# Patient Record
Sex: FEMALE | Race: BLACK OR AFRICAN AMERICAN | Employment: UNEMPLOYED | URBAN - METROPOLITAN AREA
[De-identification: names, ages, dates, MRNs, and addresses within clinical notes are randomized per-mention and may not be internally consistent; named-entity substitution may affect disease eponyms.]

---

## 2018-08-01 ENCOUNTER — OFFICE VISIT (OUTPATIENT)
Dept: INTERPRETER SERVICES | Facility: CLINIC | Age: 58
End: 2018-08-01
Payer: COMMERCIAL

## 2018-08-01 ENCOUNTER — OFFICE VISIT (OUTPATIENT)
Dept: FAMILY MEDICINE | Facility: CLINIC | Age: 58
End: 2018-08-01
Payer: COMMERCIAL

## 2018-08-01 ENCOUNTER — HOSPITAL ENCOUNTER (OUTPATIENT)
Dept: GENERAL RADIOLOGY | Facility: CLINIC | Age: 58
Discharge: HOME | End: 2018-08-01
Attending: NURSE PRACTITIONER | Admitting: NURSE PRACTITIONER
Payer: COMMERCIAL

## 2018-08-01 VITALS
OXYGEN SATURATION: 98 % | DIASTOLIC BLOOD PRESSURE: 70 MMHG | SYSTOLIC BLOOD PRESSURE: 130 MMHG | WEIGHT: 208 LBS | HEIGHT: 65 IN | HEART RATE: 80 BPM | TEMPERATURE: 97.2 F | BODY MASS INDEX: 34.66 KG/M2 | RESPIRATION RATE: 14 BRPM

## 2018-08-01 DIAGNOSIS — R76.12 POSITIVE QUANTIFERON-TB GOLD TEST: ICD-10-CM

## 2018-08-01 DIAGNOSIS — R61 NIGHT SWEATS: ICD-10-CM

## 2018-08-01 DIAGNOSIS — Z22.7 LATENT TUBERCULOSIS BY BLOOD TEST: ICD-10-CM

## 2018-08-01 DIAGNOSIS — R07.0 THROAT PAIN: Primary | ICD-10-CM

## 2018-08-01 DIAGNOSIS — R68.83 CHILLS: ICD-10-CM

## 2018-08-01 DIAGNOSIS — R30.0 DYSURIA: ICD-10-CM

## 2018-08-01 LAB
ALBUMIN UR-MCNC: NEGATIVE MG/DL
APPEARANCE UR: CLEAR
BILIRUB UR QL STRIP: NEGATIVE
COLOR UR AUTO: YELLOW
DEPRECATED S PYO AG THROAT QL EIA: NORMAL
GLUCOSE UR STRIP-MCNC: NEGATIVE MG/DL
HGB UR QL STRIP: NEGATIVE
KETONES UR STRIP-MCNC: NEGATIVE MG/DL
LEUKOCYTE ESTERASE UR QL STRIP: NEGATIVE
NITRATE UR QL: NEGATIVE
PH UR STRIP: 6 PH (ref 5–7)
SOURCE: NORMAL
SP GR UR STRIP: 1.01 (ref 1–1.03)
SPECIMEN SOURCE: NORMAL
UROBILINOGEN UR STRIP-ACNC: 0.2 EU/DL (ref 0.2–1)

## 2018-08-01 PROCEDURE — 87880 STREP A ASSAY W/OPTIC: CPT | Performed by: NURSE PRACTITIONER

## 2018-08-01 PROCEDURE — 87081 CULTURE SCREEN ONLY: CPT | Performed by: NURSE PRACTITIONER

## 2018-08-01 PROCEDURE — 71046 X-RAY EXAM CHEST 2 VIEWS: CPT

## 2018-08-01 PROCEDURE — 99205 OFFICE O/P NEW HI 60 MIN: CPT | Performed by: NURSE PRACTITIONER

## 2018-08-01 PROCEDURE — T1013 SIGN LANG/ORAL INTERPRETER: HCPCS | Mod: U3

## 2018-08-01 PROCEDURE — 81003 URINALYSIS AUTO W/O SCOPE: CPT | Performed by: NURSE PRACTITIONER

## 2018-08-01 RX ORDER — CETIRIZINE HYDROCHLORIDE 10 MG/1
10 TABLET ORAL DAILY PRN
COMMUNITY
Start: 2018-03-30 | End: 2018-08-14

## 2018-08-01 NOTE — PATIENT INSTRUCTIONS
Tuberculosis (TB)  Tuberculosis (TB) is a serious disease caused by bacteria that spread from person to person through the air. Most often, TB infects the lungs, but it can also affect other parts of the body including the brain, kidneys and spine. When not treated properly, TB can be fatal. Here is more information about TB, how it is treated, and ways to help prevent its spread.     TB germs spread through the air when someone with the active form of TB coughs or sneezes.    What are the risk factors for TB?  Anyone can get TB, but your risk is greatest if you:    Have an immune system weakened by medicinessuch as steroids or a disease such as HIV infection    Have close contact with someone who has untreated active TB    Are elderly    Live or work in a residential facility, such as a shelter, nursing home, or halfway    Travel to or come from a country where TB is common  How does TB spread?  TB germs (bacteria) are released into the air when someone with the active form of TB coughs or sneezes. The bacteria spread easily, especially in crowded places with poor airflow. The longer you breathe these germs, the more likely you are to become infected.  What are the symptoms of TB?  There are 2 types of TB: inactive (also called latent TB infection) and active (also called TB disease).  Inactive TB (latent TB infection)  If you have been diagnosed with inactive TB, it means you:    Have live TB bacteria in your lungs, but the germs have been sealed off, much like a scab covers a wound. As a result, you don t have symptoms or feel sick. The only way to know you have inactive TB is with a TB test.    Can t spread the infection to others    May need medicine to keep the infection from becoming active at some future time  Active TB (TB disease)  If you have been diagnosed with active TB, it means you:    Have symptoms of TB such as a lasting cough, tiredness (fatigue), fever, night sweats, and weight loss. You are  likely to feel very sick.    Can spread the infection to others.    Must take medicine to help cure the disease. Treatment often takes months. TB can be hard to cure.  How is TB diagnosed?  Two tests can help detect TB infection:    Skin test (PPD). A testing solution is placed just beneath the skin on your arm to see if a reaction (such as a hard, red bump) occurs. You will need to return to the office in 2 or 3 days to have your arm checked. Be sure to keep the appointment. You will learn the test results during this visit.    Blood test. In this test, a small amount of blood is drawn and sent to a lab for testing. Your healthcare provider can tell you if this test is offered in your area.    Other tests. If you have TB infection, other tests, such as a chest X-ray, are needed to learn if the infection is active. Your healthcare provider may also take a sample of your sputum (mucus that comes up when you cough). The sample is sent to a lab and tested for TB bacteria. Knowing the type of bacteria causing your illness helps your provider choose the right medicine to treat the disease.  What do the test results mean?    A negative result usually means that your body is free of TB bacteria.    A positive result means you have been exposed to the germs that cause TB. You may have an inactive or active infection.  How is TB treated?    Both inactive and active TB are treated with medicines. If you have active TB, you may take more medicine for a longer time.    You will likely begin feeling better shortly after starting treatment, but be sure to keep taking all the medicine you have been prescribed. This is the only way to cure the disease. Not taking all the medicine means you won t get well and can continue to spread TB germs to others.    Sometimes TB is drug-resistant. This means it doesn t respond to 1 or more of the usual medicines for TB. Resistant TB is harder to cure and is usually managed by a TB  specialist.  What is DOT?  During treatment, you may participate in a program called DOT (directly observed therapy). In this program, a nurse or healthcare worker supervises your treatment. This is a standard approach. It makes it easier to manage medicines, watch for any side effects, and make sure the medicines are working. You can also ask a friend or family member to remind you to take your medicine.   During treatment for TB    Make sure to take all the medicine as directed, even when you start feeling better. You will take the medicine for 6 months or longer. Sticking to this schedule takes patience. But stopping treatment early means your symptoms may come back. It may also lead to drug-resistant TB.    Get plenty of rest and eat healthy meals. A nutritious diet full of fresh fruits and vegetables helps the body fight infection.    Check with your healthcare provider before using any over-the-counter medicines that haven t been prescribed.    If you are taking birth control pills, you may need to use an additional backup method of birth control. Some TB medicines may make the pill less effective.    Limit your activity to avoid fatigue. Plan frequent rest periods.     Keep your medical appointments. You will need to be checked often to make sure that your medicine is working and you are getting better.  How family and friends can help  TB is a serious illness that takes a long time to cure. If you have a family member or friend with TB, you can help by reminding your loved one to:    Take TB medicines at the same time every day (they re best taken with water, milk, or juice 30 minutes before meals or at bedtime)    Keep all follow-up appointments (you can help by driving or arranging for a ride)    Get plenty of rest    Eat healthy meals  Preventing the spread of TB  If you have active TB, you should:    Ask family, friends, and the people you work with to get tested. Active TB can spread to other  people.    Avoid close contact with others until your healthcare provider says it s OK.    Wash your hands often, especially after coughing.    Use a tissue to cover your mouth when you cough. If you don t have a tissue, cough into your elbow, not your hand.    Wear a mask, if you have been told to do so, when you go out in public or visit your healthcare provider    Use a plastic bag to throw away old tissues and other supplies.  When to seek medical care  Call your health care provider right away if you have any of the following:    A fever of 100.4 F (38 C) or higher    Increased coughing or coughing up blood    Chest pain or shortness of breath    Worsening or recurring night sweats    Trouble breathing  Also call your healthcare provider if you are taking TB medicine and think you are having side effects. These include skin rash, yellowing of the eyes, or stomach problems.   Date Last Reviewed: 12/1/2016 2000-2017 [employer]. [address] [place] [address]. All rights reserved. This information is not intended as a substitute for professional medical care. Always follow your healthcare professional's instructions.        Self-Care for Sore Throats    Sore throats happen for many reasons, such as colds, allergies, and infections caused by viruses or bacteria. In any case, your throat becomes red and sore. Your goal for self-care is to reduce your discomfort while giving your throat a chance to heal.  Moisten and soothe your throat  Tips include the following:    Try a sip of water first thing after waking up.    Keep your throat moist by drinking 6 or more glasses of clear liquids every day.    Run a cool-air humidifier in your room overnight.    Avoid cigarette smoke.     Suck on throat lozenges, cough drops, hard candy, ice chips, or frozen fruit-juice bars. Use the sugar-free versions if your diet or medical condition requires them.  Gargle to ease irritation  Gargling every hour  or 2 can ease irritation. Try gargling with 1 of these solutions:    1/4 teaspoon of salt in 1/2 cup of warm water    An over-the-counter anesthetic gargle  Use medicine for more relief  Over-the-counter medicine can reduce sore throat symptoms. Ask your pharmacist if you have questions about which medicine to use:    Ease pain with anesthetic sprays. Aspirin or an aspirin substitute also helps. Remember, never give aspirin to anyone 18 or younger, or if you are already taking blood thinners.     For sore throats caused by allergies, try antihistamines to block the allergic reaction.    Remember: unless a sore throat is caused by a bacterial infection, antibiotics won t help you.  Prevent future sore throats  Prevention tips include the following:    Stop smoking or reduce contact with secondhand smoke. Smoke irritates the tender throat lining.    Limit contact with pets and with allergy-causing substances, such as pollen and mold.    When you re around someone with a sore throat or cold, wash your hands often to keep viruses or bacteria from spreading.    Don t strain your vocal cords.  Call your healthcare provider  Contact your healthcare provider if you have:    A temperature over 101 F (38.3 C)    White spots on the throat    Great difficulty swallowing    Trouble breathing    A skin rash    Recent exposure to someone else with strep bacteria    Severe hoarseness and swollen glands in the neck or jaw   Date Last Reviewed: 8/1/2016 2000-2017 [employer] [address] [place] [address]. All rights reserved. This information is not intended as a substitute for professional medical care. Always follow your healthcare professional's instructions.

## 2018-08-01 NOTE — PROGRESS NOTES
SUBJECTIVE:   Zoey Sinha is a 57 year old female who presents to clinic today for the following health issues:    Acute Illness   Acute illness concerns: Sore throat and cough   Onset: Months- close to a year    Fever: YES    Chills/Sweats: YES    Headache (location?): YES    Sinus Pressure:YES    Conjunctivitis:  YES: right eye    Ear Pain: no    Rhinorrhea: YES    Congestion: no     Sore Throat: YES, worse when coughing and hard time sleeping      Cough: YES    Wheeze: YES    Decreased Appetite: no    Nausea: no     Vomiting: no     Diarrhea:  no     Dysuria/Freq.: YES, frequent urination, burning urine     Fatigue/Achiness: YES    Sick/Strep Exposure: no      COMES AND GOES  SAME SYMPTOMS EACH TIME COMES BACK  Coughing, night sweats     Therapies Tried and outcome: None     Noted positive Tb test and H pylori 2/2018  QUANITFERON GOLD POSITIVE   CXR never had as far as she or her daughter know    concentrated urine and very low water intake per dtr    PROBLEMS TO ADD ON...  [facility] at very end of visit they want a referral for her nerve pain, chronic issue, not further addressed today     Problem list and histories reviewed & adjusted, as indicated.  Additional history: as documented    There is no problem list on file for this patient.    History reviewed. No pertinent surgical history.    Social History   Substance Use Topics     Smoking status: Never Smoker     Smokeless tobacco: Never Used     Alcohol use No     Family History   Problem Relation Age of Onset     Diabetes Father      Diabetes Brother          Current Outpatient Prescriptions   Medication Sig Dispense Refill     cetirizine (ZYRTEC) 10 MG tablet Take 10 mg by mouth daily as needed       ranitidine (ZANTAC) 150 MG tablet Take 150 mg by mouth daily as needed       No Known Allergies  No lab results found.   BP Readings from Last 3 Encounters:   08/01/18 130/70    Wt Readings from Last 3 Encounters:   08/01/18 208 lb (94.3 kg)                 "  Labs reviewed in EPIC    Reviewed and updated as needed this visit by clinical staff       Reviewed and updated as needed this visit by Provider         ROS:  Constitutional, HEENT, cardiovascular, pulmonary, GI, , musculoskeletal, neuro, skin, endocrine and psych systems are negative, except as otherwise noted.    OBJECTIVE:     /70  Pulse 80  Temp 97.2  F (36.2  C) (Oral)  Resp 14  Ht 5' 5.35\" (1.66 m)  Wt 208 lb (94.3 kg)  SpO2 98%  BMI 34.24 kg/m2  Body mass index is 34.24 kg/(m^2).  EXAM:   CONSTITUTIONAL: mildly ill appearing, obese, alert and no distress  EYES: Eyes grossly normal to inspection, PERRL and conjunctivae and sclerae normal  HEENT: Head normocephalic. Neck supple,  no adenopathy, TM's normal bilaterally, no sinus tenderness,  no rhinorrhea, mucous membranes dry. No tonsillar hypertrophy, pharynx slightly erythematous without exudate, swabbed samples collected and sent to lab  CV: S1 normal, S2 normal, no murmurs, clicks, or gallops and extremities warm  RESP: normal respiratory rate and rhythm, lungs clear to auscultation, strong moist sounding nonproductive cough noted   GI: Abdomen soft, non-tender. BS normal. No masses, organomegaly exam limited by obesity   Skin: no suspicious lesions or rashes, good skin turgor  Neurologic: strength and tone- normal, sensory exam- grossly normal, mentation appears normal   Diagnostic Test Results:  Results for orders placed or performed in visit on 08/01/18 (from the past 24 hour(s))   Strep, Rapid Screen   Result Value Ref Range    Specimen Description Throat     Rapid Strep A Screen       NEGATIVE: No Group A streptococcal antigen detected by immunoassay, await culture report.   UA reflex to Microscopic and Culture   Result Value Ref Range    Color Urine Yellow     Appearance Urine Clear     Glucose Urine Negative NEG^Negative mg/dL    Bilirubin Urine Negative NEG^Negative    Ketones Urine Negative NEG^Negative mg/dL    Specific Gravity " Urine 1.010 1.003 - 1.035    Blood Urine Negative NEG^Negative    pH Urine 6.0 5.0 - 7.0 pH    Protein Albumin Urine Negative NEG^Negative mg/dL    Urobilinogen Urine 0.2 0.2 - 1.0 EU/dL    Nitrite Urine Negative NEG^Negative    Leukocyte Esterase Urine Negative NEG^Negative    Source Midstream Urine      CXR - negative  Exam: XR CHEST 2 VW, 8/1/2018 1:23 PM     Indication: ; Positive QuantiFERON-TB Gold test; Chills; Night sweats     Comparison: None     Findings:   Heart and pulmonary vasculature is within normal limits. The lungs are  clear. Bones unremarkable. Upper abdomen unremarkable.         Impression: No evidence for tuberculosis, radiographically.     MICHELLE ABRAHAM MD    ASSESSMENT/PLAN:         ICD-10-CM    1. Throat pain R07.0 Strep, Rapid Screen     Beta strep group A culture   2. Dysuria R30.0 UA reflex to Microscopic and Culture   3. Positive QuantiFERON-TB Gold test R76.12 XR Chest 2 Views   4. Chills R68.83 XR Chest 2 Views   5. Night sweats R61 XR Chest 2 Views   new pt here who reports fever and coughing, sore throat and night sweats going on for about a year off and on, on Care Everywhere noted quant gold positive Tb test and no records of CXR and pt does not think has ever had this done, no known BCG vaccine or prior treatment  Discussed symptoms suspicious for Tb and needs further work-up, advised to wear a mask until results are back. Gave info below, monitor symptoms   Push fluids and symptom management for possible viral illness, mucinex and tylenol prn, pt otherwise stable and strep negative, culture pending    Denies any vomiting and no fever here, if worsening or any red flag symptoms discussed UC and ER in off hours. Otherwise follow up with me to go over results within 1 week.     Dysuria unknown cause, likely due to concentrated urine very low water intake per dtr, push fluids to see if helps. Follow up if not resolving with this.     Pt's dtr added pt has nerve pain sometimes and  want referral, was at end of visit and sounds chronic problem we did not address and they are okay waiting until next visit to address this problem.       Patient Instructions       Tuberculosis (TB)  Tuberculosis (TB) is a serious disease caused by bacteria that spread from person to person through the air. Most often, TB infects the lungs, but it can also affect other parts of the body including the brain, kidneys and spine. When not treated properly, TB can be fatal. Here is more information about TB, how it is treated, and ways to help prevent its spread.     TB germs spread through the air when someone with the active form of TB coughs or sneezes.    What are the risk factors for TB?  Anyone can get TB, but your risk is greatest if you:    Have an immune system weakened by medicinessuch as steroids or a disease such as HIV infection    Have close contact with someone who has untreated active TB    Are elderly    Live or work in a residential facility, such as a shelter, nursing home, or penitentiary    Travel to or come from a country where TB is common  How does TB spread?  TB germs (bacteria) are released into the air when someone with the active form of TB coughs or sneezes. The bacteria spread easily, especially in crowded places with poor airflow. The longer you breathe these germs, the more likely you are to become infected.  What are the symptoms of TB?  There are 2 types of TB: inactive (also called latent TB infection) and active (also called TB disease).  Inactive TB (latent TB infection)  If you have been diagnosed with inactive TB, it means you:    Have live TB bacteria in your lungs, but the germs have been sealed off, much like a scab covers a wound. As a result, you don t have symptoms or feel sick. The only way to know you have inactive TB is with a TB test.    Can t spread the infection to others    May need medicine to keep the infection from becoming active at some future time  Active TB (TB  disease)  If you have been diagnosed with active TB, it means you:    Have symptoms of TB such as a lasting cough, tiredness (fatigue), fever, night sweats, and weight loss. You are likely to feel very sick.    Can spread the infection to others.    Must take medicine to help cure the disease. Treatment often takes months. TB can be hard to cure.  How is TB diagnosed?  Two tests can help detect TB infection:    Skin test (PPD). A testing solution is placed just beneath the skin on your arm to see if a reaction (such as a hard, red bump) occurs. You will need to return to the office in 2 or 3 days to have your arm checked. Be sure to keep the appointment. You will learn the test results during this visit.    Blood test. In this test, a small amount of blood is drawn and sent to a lab for testing. Your healthcare provider can tell you if this test is offered in your area.    Other tests. If you have TB infection, other tests, such as a chest X-ray, are needed to learn if the infection is active. Your healthcare provider may also take a sample of your sputum (mucus that comes up when you cough). The sample is sent to a lab and tested for TB bacteria. Knowing the type of bacteria causing your illness helps your provider choose the right medicine to treat the disease.  What do the test results mean?    A negative result usually means that your body is free of TB bacteria.    A positive result means you have been exposed to the germs that cause TB. You may have an inactive or active infection.  How is TB treated?    Both inactive and active TB are treated with medicines. If you have active TB, you may take more medicine for a longer time.    You will likely begin feeling better shortly after starting treatment, but be sure to keep taking all the medicine you have been prescribed. This is the only way to cure the disease. Not taking all the medicine means you won t get well and can continue to spread TB germs to  others.    Sometimes TB is drug-resistant. This means it doesn t respond to 1 or more of the usual medicines for TB. Resistant TB is harder to cure and is usually managed by a TB specialist.  What is DOT?  During treatment, you may participate in a program called DOT (directly observed therapy). In this program, a nurse or healthcare worker supervises your treatment. This is a standard approach. It makes it easier to manage medicines, watch for any side effects, and make sure the medicines are working. You can also ask a friend or family member to remind you to take your medicine.   During treatment for TB    Make sure to take all the medicine as directed, even when you start feeling better. You will take the medicine for 6 months or longer. Sticking to this schedule takes patience. But stopping treatment early means your symptoms may come back. It may also lead to drug-resistant TB.    Get plenty of rest and eat healthy meals. A nutritious diet full of fresh fruits and vegetables helps the body fight infection.    Check with your healthcare provider before using any over-the-counter medicines that haven t been prescribed.    If you are taking birth control pills, you may need to use an additional backup method of birth control. Some TB medicines may make the pill less effective.    Limit your activity to avoid fatigue. Plan frequent rest periods.     Keep your medical appointments. You will need to be checked often to make sure that your medicine is working and you are getting better.  How family and friends can help  TB is a serious illness that takes a long time to cure. If you have a family member or friend with TB, you can help by reminding your loved one to:    Take TB medicines at the same time every day (they re best taken with water, milk, or juice 30 minutes before meals or at bedtime)    Keep all follow-up appointments (you can help by driving or arranging for a ride)    Get plenty of rest    Eat healthy  meals  Preventing the spread of TB  If you have active TB, you should:    Ask family, friends, and the people you work with to get tested. Active TB can spread to other people.    Avoid close contact with others until your healthcare provider says it s OK.    Wash your hands often, especially after coughing.    Use a tissue to cover your mouth when you cough. If you don t have a tissue, cough into your elbow, not your hand.    Wear a mask, if you have been told to do so, when you go out in public or visit your healthcare provider    Use a plastic bag to throw away old tissues and other supplies.  When to seek medical care  Call your health care provider right away if you have any of the following:    A fever of 100.4 F (38 C) or higher    Increased coughing or coughing up blood    Chest pain or shortness of breath    Worsening or recurring night sweats    Trouble breathing  Also call your healthcare provider if you are taking TB medicine and think you are having side effects. These include skin rash, yellowing of the eyes, or stomach problems.   Date Last Reviewed: 12/1/2016 2000-2017 [employer]. [address] [place] [address]. All rights reserved. This information is not intended as a substitute for professional medical care. Always follow your healthcare professional's instructions.        Self-Care for Sore Throats    Sore throats happen for many reasons, such as colds, allergies, and infections caused by viruses or bacteria. In any case, your throat becomes red and sore. Your goal for self-care is to reduce your discomfort while giving your throat a chance to heal.  Moisten and soothe your throat  Tips include the following:    Try a sip of water first thing after waking up.    Keep your throat moist by drinking 6 or more glasses of clear liquids every day.    Run a cool-air humidifier in your room overnight.    Avoid cigarette smoke.     Suck on throat lozenges, cough drops, hard  candy, ice chips, or frozen fruit-juice bars. Use the sugar-free versions if your diet or medical condition requires them.  Gargle to ease irritation  Gargling every hour or 2 can ease irritation. Try gargling with 1 of these solutions:    1/4 teaspoon of salt in 1/2 cup of warm water    An over-the-counter anesthetic gargle  Use medicine for more relief  Over-the-counter medicine can reduce sore throat symptoms. Ask your pharmacist if you have questions about which medicine to use:    Ease pain with anesthetic sprays. Aspirin or an aspirin substitute also helps. Remember, never give aspirin to anyone 18 or younger, or if you are already taking blood thinners.     For sore throats caused by allergies, try antihistamines to block the allergic reaction.    Remember: unless a sore throat is caused by a bacterial infection, antibiotics won t help you.  Prevent future sore throats  Prevention tips include the following:    Stop smoking or reduce contact with secondhand smoke. Smoke irritates the tender throat lining.    Limit contact with pets and with allergy-causing substances, such as pollen and mold.    When you re around someone with a sore throat or cold, wash your hands often to keep viruses or bacteria from spreading.    Don t strain your vocal cords.  Call your healthcare provider  Contact your healthcare provider if you have:    A temperature over 101 F (38.3 C)    White spots on the throat    Great difficulty swallowing    Trouble breathing    A skin rash    Recent exposure to someone else with strep bacteria    Severe hoarseness and swollen glands in the neck or jaw   Date Last Reviewed: 8/1/2016 2000-2017 [employer]. [address] [place] [address], [place] [address]. All rights reserved. This information is not intended as a substitute for professional medical care. Always follow your healthcare professional's instructions.        60 min spent in direct face to face time with this pt, greater than  50% in counseling and coordination of care of above diagnoses      RANDALL Mendoza [facility]

## 2018-08-01 NOTE — MR AVS SNAPSHOT
After Visit Summary   8/1/2018    Zoey Sinha    MRN: [id number]           Patient Information     Date Of Birth          1960        Visit Information        Provider Department      8/1/2018 11:15 AM Holly Salas APRN CNP; LANGUAGE [facility]        Today's Diagnoses     Throat pain    -  1    Dysuria        Positive QuantiFERON-TB Gold test        Chills        Night sweats          Care Instructions      Tuberculosis (TB)  Tuberculosis (TB) is a serious disease caused by bacteria that spread from person to person through the air. Most often, TB infects the lungs, but it can also affect other parts of the body including the brain, kidneys and spine. When not treated properly, TB can be fatal. Here is more information about TB, how it is treated, and ways to help prevent its spread.     TB germs spread through the air when someone with the active form of TB coughs or sneezes.    What are the risk factors for TB?  Anyone can get TB, but your risk is greatest if you:    Have an immune system weakened by medicinessuch as steroids or a disease such as HIV infection    Have close contact with someone who has untreated active TB    Are elderly    Live or work in a residential facility, such as a shelter, nursing home, or halfway    Travel to or come from a country where TB is common  How does TB spread?  TB germs (bacteria) are released into the air when someone with the active form of TB coughs or sneezes. The bacteria spread easily, especially in crowded places with poor airflow. The longer you breathe these germs, the more likely you are to become infected.  What are the symptoms of TB?  There are 2 types of TB: inactive (also called latent TB infection) and active (also called TB disease).  Inactive TB (latent TB infection)  If you have been diagnosed with inactive TB, it means you:    Have live TB bacteria in your lungs, but the germs have been sealed off, much like a  scab covers a wound. As a result, you don t have symptoms or feel sick. The only way to know you have inactive TB is with a TB test.    Can t spread the infection to others    May need medicine to keep the infection from becoming active at some future time  Active TB (TB disease)  If you have been diagnosed with active TB, it means you:    Have symptoms of TB such as a lasting cough, tiredness (fatigue), fever, night sweats, and weight loss. You are likely to feel very sick.    Can spread the infection to others.    Must take medicine to help cure the disease. Treatment often takes months. TB can be hard to cure.  How is TB diagnosed?  Two tests can help detect TB infection:    Skin test (PPD). A testing solution is placed just beneath the skin on your arm to see if a reaction (such as a hard, red bump) occurs. You will need to return to the office in 2 or 3 days to have your arm checked. Be sure to keep the appointment. You will learn the test results during this visit.    Blood test. In this test, a small amount of blood is drawn and sent to a lab for testing. Your healthcare provider can tell you if this test is offered in your area.    Other tests. If you have TB infection, other tests, such as a chest X-ray, are needed to learn if the infection is active. Your healthcare provider may also take a sample of your sputum (mucus that comes up when you cough). The sample is sent to a lab and tested for TB bacteria. Knowing the type of bacteria causing your illness helps your provider choose the right medicine to treat the disease.  What do the test results mean?    A negative result usually means that your body is free of TB bacteria.    A positive result means you have been exposed to the germs that cause TB. You may have an inactive or active infection.  How is TB treated?    Both inactive and active TB are treated with medicines. If you have active TB, you may take more medicine for a longer time.    You will  likely begin feeling better shortly after starting treatment, but be sure to keep taking all the medicine you have been prescribed. This is the only way to cure the disease. Not taking all the medicine means you won t get well and can continue to spread TB germs to others.    Sometimes TB is drug-resistant. This means it doesn t respond to 1 or more of the usual medicines for TB. Resistant TB is harder to cure and is usually managed by a TB specialist.  What is DOT?  During treatment, you may participate in a program called DOT (directly observed therapy). In this program, a nurse or healthcare worker supervises your treatment. This is a standard approach. It makes it easier to manage medicines, watch for any side effects, and make sure the medicines are working. You can also ask a friend or family member to remind you to take your medicine.   During treatment for TB    Make sure to take all the medicine as directed, even when you start feeling better. You will take the medicine for 6 months or longer. Sticking to this schedule takes patience. But stopping treatment early means your symptoms may come back. It may also lead to drug-resistant TB.    Get plenty of rest and eat healthy meals. A nutritious diet full of fresh fruits and vegetables helps the body fight infection.    Check with your healthcare provider before using any over-the-counter medicines that haven t been prescribed.    If you are taking birth control pills, you may need to use an additional backup method of birth control. Some TB medicines may make the pill less effective.    Limit your activity to avoid fatigue. Plan frequent rest periods.     Keep your medical appointments. You will need to be checked often to make sure that your medicine is working and you are getting better.  How family and friends can help  TB is a serious illness that takes a long time to cure. If you have a family member or friend with TB, you can help by reminding your  loved one to:    Take TB medicines at the same time every day (they re best taken with water, milk, or juice 30 minutes before meals or at bedtime)    Keep all follow-up appointments (you can help by driving or arranging for a ride)    Get plenty of rest    Eat healthy meals  Preventing the spread of TB  If you have active TB, you should:    Ask family, friends, and the people you work with to get tested. Active TB can spread to other people.    Avoid close contact with others until your healthcare provider says it s OK.    Wash your hands often, especially after coughing.    Use a tissue to cover your mouth when you cough. If you don t have a tissue, cough into your elbow, not your hand.    Wear a mask, if you have been told to do so, when you go out in public or visit your healthcare provider    Use a plastic bag to throw away old tissues and other supplies.  When to seek medical care  Call your health care provider right away if you have any of the following:    A fever of 100.4 F (38 C) or higher    Increased coughing or coughing up blood    Chest pain or shortness of breath    Worsening or recurring night sweats    Trouble breathing  Also call your healthcare provider if you are taking TB medicine and think you are having side effects. These include skin rash, yellowing of the eyes, or stomach problems.   Date Last Reviewed: 12/1/2016 2000-2017 [employer]. [address] [place] [address]. All rights reserved. This information is not intended as a substitute for professional medical care. Always follow your healthcare professional's instructions.        Self-Care for Sore Throats    Sore throats happen for many reasons, such as colds, allergies, and infections caused by viruses or bacteria. In any case, your throat becomes red and sore. Your goal for self-care is to reduce your discomfort while giving your throat a chance to heal.  Moisten and soothe your throat  Tips include the  following:    Try a sip of water first thing after waking up.    Keep your throat moist by drinking 6 or more glasses of clear liquids every day.    Run a cool-air humidifier in your room overnight.    Avoid cigarette smoke.     Suck on throat lozenges, cough drops, hard candy, ice chips, or frozen fruit-juice bars. Use the sugar-free versions if your diet or medical condition requires them.  Gargle to ease irritation  Gargling every hour or 2 can ease irritation. Try gargling with 1 of these solutions:    1/4 teaspoon of salt in 1/2 cup of warm water    An over-the-counter anesthetic gargle  Use medicine for more relief  Over-the-counter medicine can reduce sore throat symptoms. Ask your pharmacist if you have questions about which medicine to use:    Ease pain with anesthetic sprays. Aspirin or an aspirin substitute also helps. Remember, never give aspirin to anyone 18 or younger, or if you are already taking blood thinners.     For sore throats caused by allergies, try antihistamines to block the allergic reaction.    Remember: unless a sore throat is caused by a bacterial infection, antibiotics won t help you.  Prevent future sore throats  Prevention tips include the following:    Stop smoking or reduce contact with secondhand smoke. Smoke irritates the tender throat lining.    Limit contact with pets and with allergy-causing substances, such as pollen and mold.    When you re around someone with a sore throat or cold, wash your hands often to keep viruses or bacteria from spreading.    Don t strain your vocal cords.  Call your healthcare provider  Contact your healthcare provider if you have:    A temperature over 101 F (38.3 C)    White spots on the throat    Great difficulty swallowing    Trouble breathing    A skin rash    Recent exposure to someone else with strep bacteria    Severe hoarseness and swollen glands in the neck or jaw   Date Last Reviewed: 8/1/2016 2000-2017 [employer] [address]  "[place] [address]. All rights reserved. This information is not intended as a substitute for professional medical care. Always follow your healthcare professional's instructions.                Follow-ups after your visit        Future tests that were ordered for you today     Open Future Orders        Priority Expected Expires Ordered    XR Chest 2 Views Routine 8/1/2018 8/1/2019 8/1/2018            Who to contact     If you have questions or need follow up information about today's clinic visit or your schedule please contact [facility] directly at [phone].  Normal or non-critical lab and imaging results will be communicated to you by MyChart, letter or phone within 4 business days after the clinic has received the results. If you do not hear from us within 7 days, please contact the clinic through MyChart or phone. If you have a critical or abnormal lab result, we will notify you by phone as soon as possible.  Submit refill requests through [employer] or call your pharmacy and they will forward the refill request to us. Please allow 3 business days for your refill to be completed.          Additional Information About Your Visit        Care EveryWhere ID     This is your Care EveryWhere ID. This could be used by other organizations to access your [facility] medical records  [id number]        Your Vitals Were     Pulse Temperature Respirations Height Pulse Oximetry BMI (Body Mass Index)    80 97.2  F (36.2  C) (Oral) 14 5' 5.35\" (1.66 m) 98% 34.24 kg/m2       Blood Pressure from Last 3 Encounters:   08/01/18 130/70    Weight from Last 3 Encounters:   08/01/18 208 lb (94.3 kg)              We Performed the Following     Beta strep group A culture     Strep, Rapid Screen     UA reflex to Microscopic and Culture        Primary Care Provider Fax #    Physician No Ref-Primary [phone]       No address on file        Equal Access to Services     SHAE JOHN AH: Hadii isai torres " chau Cage, michael fayeadaha, qadesireeta kahenok rboerts, mikal yaritzain hayaajorge arroyotato emmanuelольга laRamosbonnie richie. So [facility] [phone].    ATENCIÓN: Si habla español, tiene a wright disposición servicios gratuitos de asistencia lingüística. Sammy al [phone].    We comply with applicable federal civil rights laws and [place] laws. We do not discriminate on the basis of race, color, national origin, age, disability, sex, sexual orientation, or gender identity.            Thank you!     Thank you for choosing [facility]  for your care. Our goal is always to provide you with excellent care. Hearing back from our patients is one way we can continue to improve our services. Please take a few minutes to complete the written survey that you may receive in the mail after your visit with us. Thank you!             Your Updated Medication List - Protect others around you: Learn how to safely use, store and throw away your medicines at www.disposemymeds.org.          This list is accurate as of 8/1/18 12:27 PM.  Always use your most recent med list.                   Brand Name Dispense Instructions for use Diagnosis    cetirizine 10 MG tablet    zyrTEC     Take 10 mg by mouth daily as needed        ranitidine 150 MG tablet    ZANTAC     Take 150 mg by mouth daily as needed

## 2018-08-02 LAB
BACTERIA SPEC CULT: NORMAL
SPECIMEN SOURCE: NORMAL

## 2018-08-07 ENCOUNTER — OFFICE VISIT (OUTPATIENT)
Dept: FAMILY MEDICINE | Facility: CLINIC | Age: 58
End: 2018-08-07
Payer: COMMERCIAL

## 2018-08-07 VITALS
SYSTOLIC BLOOD PRESSURE: 120 MMHG | BODY MASS INDEX: 34.24 KG/M2 | WEIGHT: 208 LBS | OXYGEN SATURATION: 100 % | TEMPERATURE: 98.7 F | HEART RATE: 88 BPM | RESPIRATION RATE: 16 BRPM | DIASTOLIC BLOOD PRESSURE: 60 MMHG

## 2018-08-07 DIAGNOSIS — R68.83 CHILLS (WITHOUT FEVER): ICD-10-CM

## 2018-08-07 DIAGNOSIS — R11.0 NAUSEA: ICD-10-CM

## 2018-08-07 DIAGNOSIS — B96.81 DUODENAL ULCER DUE TO HELICOBACTER PYLORI: ICD-10-CM

## 2018-08-07 DIAGNOSIS — M79.2 NERVE PAIN: ICD-10-CM

## 2018-08-07 DIAGNOSIS — K26.9 DUODENAL ULCER DUE TO HELICOBACTER PYLORI: ICD-10-CM

## 2018-08-07 DIAGNOSIS — M72.2 PLANTAR FASCIITIS: ICD-10-CM

## 2018-08-07 DIAGNOSIS — R10.84 ABDOMINAL PAIN, GENERALIZED: Primary | ICD-10-CM

## 2018-08-07 DIAGNOSIS — R09.82 PND (POST-NASAL DRIP): ICD-10-CM

## 2018-08-07 LAB
BASOPHILS # BLD AUTO: 0 10E9/L (ref 0–0.2)
BASOPHILS NFR BLD AUTO: 0.6 %
DIFFERENTIAL METHOD BLD: NORMAL
EOSINOPHIL # BLD AUTO: 0.1 10E9/L (ref 0–0.7)
EOSINOPHIL NFR BLD AUTO: 1.2 %
ERYTHROCYTE [DISTWIDTH] IN BLOOD BY AUTOMATED COUNT: 14.8 % (ref 10–15)
HCT VFR BLD AUTO: 42.6 % (ref 35–47)
HGB BLD-MCNC: 14.2 G/DL (ref 11.7–15.7)
LYMPHOCYTES # BLD AUTO: 2 10E9/L (ref 0.8–5.3)
LYMPHOCYTES NFR BLD AUTO: 40 %
MCH RBC QN AUTO: 29.2 PG (ref 26.5–33)
MCHC RBC AUTO-ENTMCNC: 33.3 G/DL (ref 31.5–36.5)
MCV RBC AUTO: 88 FL (ref 78–100)
MONOCYTES # BLD AUTO: 0.4 10E9/L (ref 0–1.3)
MONOCYTES NFR BLD AUTO: 8.8 %
NEUTROPHILS # BLD AUTO: 2.4 10E9/L (ref 1.6–8.3)
NEUTROPHILS NFR BLD AUTO: 49.4 %
PLATELET # BLD AUTO: 297 10E9/L (ref 150–450)
RBC # BLD AUTO: 4.86 10E12/L (ref 3.8–5.2)
VIT B12 SERPL-MCNC: 452 PG/ML (ref 193–986)
WBC # BLD AUTO: 4.9 10E9/L (ref 4–11)

## 2018-08-07 PROCEDURE — 99214 OFFICE O/P EST MOD 30 MIN: CPT | Performed by: NURSE PRACTITIONER

## 2018-08-07 PROCEDURE — 80048 BASIC METABOLIC PNL TOTAL CA: CPT | Performed by: NURSE PRACTITIONER

## 2018-08-07 PROCEDURE — 85025 COMPLETE CBC W/AUTO DIFF WBC: CPT | Performed by: NURSE PRACTITIONER

## 2018-08-07 PROCEDURE — 36415 COLL VENOUS BLD VENIPUNCTURE: CPT | Performed by: NURSE PRACTITIONER

## 2018-08-07 PROCEDURE — 82306 VITAMIN D 25 HYDROXY: CPT | Performed by: NURSE PRACTITIONER

## 2018-08-07 PROCEDURE — 82607 VITAMIN B-12: CPT | Performed by: NURSE PRACTITIONER

## 2018-08-07 PROCEDURE — 84443 ASSAY THYROID STIM HORMONE: CPT | Performed by: NURSE PRACTITIONER

## 2018-08-07 RX ORDER — CETIRIZINE HYDROCHLORIDE 10 MG/1
10 TABLET ORAL EVERY EVENING
Qty: 30 TABLET | Refills: 1 | Status: SHIPPED | OUTPATIENT
Start: 2018-08-07

## 2018-08-07 NOTE — PATIENT INSTRUCTIONS

## 2018-08-07 NOTE — PROGRESS NOTES
SUBJECTIVE:   Zoey Sinha is a 57 year old female who presents to clinic today for the following health issues:      Acute Illness   Acute illness concerns: Nausea   Onset: 8/1/2018    Fever: YES    Chills/Sweats: YES    Headache (location?): YES    Sinus Pressure:YES    Conjunctivitis:  Yes    Ear Pain: YES: both    Rhinorrhea: no    Congestion: no    Sore Throat: YES     Cough: no    Wheeze: no     Decreased Appetite: no     Nausea: YES    Vomiting: no     Diarrhea:  no     Dysuria/Freq.: YES    Fatigue/Achiness: no     Sick/Strep Exposure: no      Therapies Tried and outcome:  Nothing     Here for results from last office visit. ROS generally positive today, but after clarifying many symptoms with  today main problem is ongong mucous and drainage and also abd pain, Tightness in chest  When weather changes they have the AC on and notices more tightness in her chest, hasn't checked temp    Right foot pain   pain came back yesterday severe   History of Plantar fascitis, Injection in right foot helped --saw specialist and not sure if was steroid, not wearing good shoes here today sandals without arch support   Not really numb or tingling just shooting pains everywhere in her body       Problem list and histories reviewed & adjusted, as indicated.  Additional history: as documented    Patient Active Problem List   Diagnosis     Latent tuberculosis by blood test     Body mass index 34.0-34.9, adult     History reviewed. No pertinent surgical history.    Social History   Substance Use Topics     Smoking status: Never Smoker     Smokeless tobacco: Never Used     Alcohol use No     Family History   Problem Relation Age of Onset     Diabetes Father      Diabetes Brother          Current Outpatient Prescriptions   Medication Sig Dispense Refill     cetirizine (ZYRTEC) 10 MG tablet Take 1 tablet (10 mg) by mouth every evening 30 tablet 1     cetirizine (ZYRTEC) 10 MG tablet Take 10 mg by mouth daily as needed        ranitidine (ZANTAC) 150 MG tablet Take 150 mg by mouth daily as needed       No Known Allergies  Recent Labs   Lab Test  08/07/18   1517   CR  0.76   GFRESTIMATED  78   GFRESTBLACK  >90   POTASSIUM  4.0   TSH  1.08      BP Readings from Last 3 Encounters:   08/07/18 120/60   08/01/18 130/70    Wt Readings from Last 3 Encounters:   08/07/18 208 lb (94.3 kg)   08/01/18 208 lb (94.3 kg)                  Labs reviewed in EPIC    Reviewed and updated as needed this visit by clinical staff       Reviewed and updated as needed this visit by Provider         ROS:  Constitutional, HEENT, cardiovascular, pulmonary, GI, , musculoskeletal, neuro, skin, endocrine and psych systems are negative, except as otherwise noted.    OBJECTIVE:     /60  Pulse 88  Temp 98.7  F (37.1  C) (Oral)  Resp 16  Wt 208 lb (94.3 kg)  SpO2 100%  BMI 34.24 kg/m2  Body mass index is 34.24 kg/(m^2).   GENERAL: mildly ill appearing, alert and in no distress  EYES: Eyes grossly normal to inspection, no discharge. Extraocular movements - intact, and PERRL  HENT: Normocephalic, ear canals- normal; TMs- normal ; No sinus tenderness  Nose- normal with clear rhinnorhea; oropharynx clear without erythema and does have clear pnd, Mouth- no ulcers, no lesions and mucous membranes moist  NECK: no tenderness, no adenopathy, no asymmetry, no masses, no stiffness  RESP: lungs clear to auscultation - no rales, no rhonchi, no wheezes  CV: regular rates and rhythm, normal S1 S2, no S3 or S4 and no murmur, no click or rub - peripheral pulses normal and brisk cap refill   ABDOMEN: soft, mild generalized tenderness, no hepatosplenomegaly, no masses although limited by obesity, normal bowel sounds, no McBurney or rebound tenderness, negative Dudley's    MS: extremities- no gross deformities noted, no edema, positive testing for plantar fascitis in right foot and slightly in left as well  SKIN: no suspicious lesions, no rashes, good skin turgor  NEURO:  strength and tone- normal, sensory exam- grossly normal, mentation appears normal      Diagnostic Test Results:  No results found for this or any previous visit (from the past 24 hour(s)).    ASSESSMENT/PLAN:         ICD-10-CM    1. Abdominal pain, generalized R10.84 H Pylori antigen stool     Ova and Parasite Exam Routine   2. Plantar fasciitis M72.2    3. Nerve pain M79.2 **TSH with free T4 reflex FUTURE 1yr     Vitamin B12     Basic metabolic panel     CBC with platelets differential     Vitamin D Deficiency     **TSH with free T4 reflex FUTURE 1yr   4. Nausea R11.0 H Pylori antigen stool     Ova and Parasite Exam Routine   5. Chills (without fever) R68.83    6. Body mass index 34.0-34.9, adult Z68.34 **TSH with free T4 reflex FUTURE 1yr     **TSH with free T4 reflex FUTURE 1yr   7. PND (post-nasal drip) R09.82 cetirizine (ZYRTEC) 10 MG tablet   pt here with many symptoms and concerns, Here for results from last office visit. ROS generally positive today, but after clarifying many symptoms with  today main problem is ongong mucous and drainage and also abd pain, Tightness in chest and ongoing foot pain  Will do labs to work-up nerve pain and abd pain she is worried is similar to when she had h pylori and parasite in the past, follow up as indicated.   Wearing flat sandals today, needs new shoes with arch support     Trial of zyrtec for pnd and chest tightness for suspected environmental allergies, exam otherwise normal today no red flags for acute abdomen or cardiac causes  Pt obese and discussed could cause restrictive lung disease, asthma as other differential. Return to Clinic in 1-2 weeks and will go over lab results at that time  Pt also requested letter for results     I instructed to monitor and  Discussed UC and ER in off hours if any concerning symptoms    Patient Instructions       Understanding Plantar Fasciitis    Plantar fasciitis is a condition that causes foot and heel pain. The plantar  fascia is a tough band of tissue that runs across the bottom of the foot from the heel to the toes. This tissue pulls on the heel bone. It supports the arch of the foot as it pushes off the ground. If the tissue becomes irritated or red and swollen (inflamed), it is called plantar fasciitis.  How to say it  PLAN-tuhr fa-see-IY-tis   What causes plantar fasciitis?  Plantar fasciitis most often occurs from overusing the plantar fascia. The tissue may become damaged from activities that put repeated stress on the heel and foot. Or it may wear down over time with age and ankle stiffness. You are more likely to have plantar fasciitis if you:    Do activities that require a lot of running, jumping, or dancing    Have a job that requires being on your feet for long periods    Are overweight or obese    Have certain foot problems, such as a tight Achilles tendon, flat feet, or high arches    Often wear poorly fitting shoes  Symptoms of plantar fasciitis  The condition most often causes pain in the heel and the bottom of the foot. The pain may occur when you take your first steps in the morning. It may get better as you walk throughout the day. But as you continue to put weight on the foot, the pain often returns. Pain may also occur after standing or sitting for long periods.  Treating plantar fasciitis  Treatments for plantar fasciitis include:    Resting the foot. This involves limiting movements that make your foot hurt. You may also need to avoid certain sports and types of work for a time.    Using cold packs. Put an ice pack on the heel and foot to help reduce pain and swelling.    Taking pain medicines. Prescription and over-the-counter pain medicines can help relieve pain and swelling.    Using heel cups or foot inserts (orthotics). These are placed in the shoes to help support the heel or arch and cushion the heel. You may also be told to buy proper-fitting shoes with good arch support and cushioned  soles.    Taping the foot. This supports the arch and limits the movement of the plantar fascia to help relieve symptoms.    Wearing a night splint. This stretches the plantar fascia and leg muscles while you sleep. This may help relieve pain.    Doing exercises and physical therapy. These stretch and strengthen the plantar fascia and the muscles in the leg that support the heel and foot.    Getting shots of medicine into the foot. These may help relieve symptoms for a time.    Having surgery. This may be needed if other treatments fail to relieve symptoms. During surgery, the surgeon may partially cut the plantar fascia to release tension.  Possible complications of plantar fasciitis  Without proper care and treatment, healing may take longer than normal. Also, symptoms may continue or get worse. Over time, the plantar fascia may be damaged. This can make it hard to walk or even stand without pain.  When to call your healthcare provider  Call your healthcare provider right away if you have any of these:    Fever of 100.4 F (38 C) or higher, or as directed    Symptoms that don t get better with treatment, or get worse    New symptoms, such as numbness, tingling, or weakness in the foot   Date Last Reviewed: 3/10/2016    9183-8945 [employer]. [address] [place] [address], [place] [address]. All rights reserved. This information is not intended as a substitute for professional medical care. Always follow your healthcare professional's instructions.            RANDALL Mendoza [facility]

## 2018-08-07 NOTE — MR AVS SNAPSHOT
After Visit Summary   8/7/2018    Zoey Sinha    MRN: [id number]           Patient Information     Date Of Birth          1960        Visit Information        Provider Department      8/7/2018 1:45 PM Holly Salas APRN CNP; LANGUAGE [facility]        Today's Diagnoses     Abdominal pain, generalized    -  1    Plantar fasciitis        Nerve pain        Nausea        Chills (without fever)        Body mass index 34.0-34.9, adult        PND (post-nasal drip)          Care Instructions      Understanding Plantar Fasciitis    Plantar fasciitis is a condition that causes foot and heel pain. The plantar fascia is a tough band of tissue that runs across the bottom of the foot from the heel to the toes. This tissue pulls on the heel bone. It supports the arch of the foot as it pushes off the ground. If the tissue becomes irritated or red and swollen (inflamed), it is called plantar fasciitis.  How to say it  PLAN-tuhr fa-see-IY-tis   What causes plantar fasciitis?  Plantar fasciitis most often occurs from overusing the plantar fascia. The tissue may become damaged from activities that put repeated stress on the heel and foot. Or it may wear down over time with age and ankle stiffness. You are more likely to have plantar fasciitis if you:    Do activities that require a lot of running, jumping, or dancing    Have a job that requires being on your feet for long periods    Are overweight or obese    Have certain foot problems, such as a tight Achilles tendon, flat feet, or high arches    Often wear poorly fitting shoes  Symptoms of plantar fasciitis  The condition most often causes pain in the heel and the bottom of the foot. The pain may occur when you take your first steps in the morning. It may get better as you walk throughout the day. But as you continue to put weight on the foot, the pain often returns. Pain may also occur after standing or sitting for long  periods.  Treating plantar fasciitis  Treatments for plantar fasciitis include:    Resting the foot. This involves limiting movements that make your foot hurt. You may also need to avoid certain sports and types of work for a time.    Using cold packs. Put an ice pack on the heel and foot to help reduce pain and swelling.    Taking pain medicines. Prescription and over-the-counter pain medicines can help relieve pain and swelling.    Using heel cups or foot inserts (orthotics). These are placed in the shoes to help support the heel or arch and cushion the heel. You may also be told to buy proper-fitting shoes with good arch support and cushioned soles.    Taping the foot. This supports the arch and limits the movement of the plantar fascia to help relieve symptoms.    Wearing a night splint. This stretches the plantar fascia and leg muscles while you sleep. This may help relieve pain.    Doing exercises and physical therapy. These stretch and strengthen the plantar fascia and the muscles in the leg that support the heel and foot.    Getting shots of medicine into the foot. These may help relieve symptoms for a time.    Having surgery. This may be needed if other treatments fail to relieve symptoms. During surgery, the surgeon may partially cut the plantar fascia to release tension.  Possible complications of plantar fasciitis  Without proper care and treatment, healing may take longer than normal. Also, symptoms may continue or get worse. Over time, the plantar fascia may be damaged. This can make it hard to walk or even stand without pain.  When to call your healthcare provider  Call your healthcare provider right away if you have any of these:    Fever of 100.4 F (38 C) or higher, or as directed    Symptoms that don t get better with treatment, or get worse    New symptoms, such as numbness, tingling, or weakness in the foot   Date Last Reviewed: 3/10/2016    8981-6609 [employer] [address], [place] [address]. All rights reserved. This information is not intended as a substitute for professional medical care. Always follow your healthcare professional's instructions.                Follow-ups after your visit        Future tests that were ordered for you today     Open Future Orders        Priority Expected Expires Ordered    H Pylori antigen stool Routine  9/6/2018 8/7/2018    Ova and Parasite Exam Routine Routine  8/7/2019 8/7/2018            Who to contact     If you have questions or need follow up information about today's clinic visit or your schedule please contact [facility] directly at [phone].  Normal or non-critical lab and imaging results will be communicated to you by MyChart, letter or phone within 4 business days after the clinic has received the results. If you do not hear from us within 7 days, please contact the clinic through MyChart or phone. If you have a critical or abnormal lab result, we will notify you by phone as soon as possible.  Submit refill requests through [employer] or call your pharmacy and they will forward the refill request to us. Please allow 3 business days for your refill to be completed.          Additional Information About Your Visit        Care EveryWhere ID     This is your Care EveryWhere ID. This could be used by other organizations to access your [facility] medical records  [id number]        Your Vitals Were     Pulse Temperature BMI (Body Mass Index)             88 98.7  F (37.1  C) (Oral) 34.24 kg/m2          Blood Pressure from Last 3 Encounters:   08/07/18 120/60   08/01/18 130/70    Weight from Last 3 Encounters:   08/07/18 208 lb (94.3 kg)   08/01/18 208 lb (94.3 kg)                 Today's Medication Changes          These changes are accurate as of 8/7/18  3:09 PM.  If you have any questions, ask your nurse or doctor.               These medicines have changed or have updated prescriptions.        Dose/Directions    *  cetirizine 10 MG tablet   Commonly known as:  zyrTEC   This may have changed:  Another medication with the same name was added. Make sure you understand how and when to take each.   Changed by:  Holly Salas APRN CNP        Dose:  10 mg   Take 10 mg by mouth daily as needed   Refills:  0       * cetirizine 10 MG tablet   Commonly known as:  zyrTEC   This may have changed:  You were already taking a medication with the same name, and this prescription was added. Make sure you understand how and when to take each.   Used for:  PND (post-nasal drip)   Changed by:  Holly Salas APRN CNP        Dose:  10 mg   Take 1 tablet (10 mg) by mouth every evening   Quantity:  30 tablet   Refills:  1       * Notice:  This list has 2 medication(s) that are the same as other medications prescribed for you. Read the directions carefully, and ask your doctor or other care provider to review them with you.         Where to get your medicines      These medications were sent to [facility] [address]  [address] [address]     Phone:  [phone]     cetirizine 10 MG tablet                Primary Care Provider Fax #    Physician No Ref-Primary [phone]       No address on file        Equal Access to Services     SHAE JOHN : Viktor stollo Somimi, waaxda luqadaha, qaybta kaalmada adeegyada, mikal quiroz. So [facility] [phone].    ATENCIÓN: Si habla español, tiene a wright disposición servicios gratuitos de asistencia lingüística. Llame al [phone].    We comply with applicable federal civil rights laws and [place] laws. We do not discriminate on the basis of race, color, national origin, age, disability, sex, sexual orientation, or gender identity.            Thank you!     Thank you for choosing [facility]  for your care. Our goal is always to provide you with excellent care. Hearing back from our  patients is one way we can continue to improve our services. Please take a few minutes to complete the written survey that you may receive in the mail after your visit with us. Thank you!             Your Updated Medication List - Protect others around you: Learn how to safely use, store and throw away your medicines at www.disposemymeds.org.          This list is accurate as of 8/7/18  3:09 PM.  Always use your most recent med list.                   Brand Name Dispense Instructions for use Diagnosis    * cetirizine 10 MG tablet    zyrTEC     Take 10 mg by mouth daily as needed        * cetirizine 10 MG tablet    zyrTEC    30 tablet    Take 1 tablet (10 mg) by mouth every evening    PND (post-nasal drip)       ranitidine 150 MG tablet    ZANTAC     Take 150 mg by mouth daily as needed        * Notice:  This list has 2 medication(s) that are the same as other medications prescribed for you. Read the directions carefully, and ask your doctor or other care provider to review them with you.

## 2018-08-08 LAB
ANION GAP SERPL CALCULATED.3IONS-SCNC: 10 MMOL/L (ref 3–14)
BUN SERPL-MCNC: 12 MG/DL (ref 7–30)
CALCIUM SERPL-MCNC: 9.1 MG/DL (ref 8.5–10.1)
CHLORIDE SERPL-SCNC: 105 MMOL/L (ref 94–109)
CO2 SERPL-SCNC: 26 MMOL/L (ref 20–32)
CREAT SERPL-MCNC: 0.76 MG/DL (ref 0.52–1.04)
DEPRECATED CALCIDIOL+CALCIFEROL SERPL-MC: 18 UG/L (ref 20–75)
GFR SERPL CREATININE-BSD FRML MDRD: 78 ML/MIN/1.7M2
GLUCOSE SERPL-MCNC: 116 MG/DL (ref 70–99)
POTASSIUM SERPL-SCNC: 4 MMOL/L (ref 3.4–5.3)
SODIUM SERPL-SCNC: 141 MMOL/L (ref 133–144)
TSH SERPL DL<=0.005 MIU/L-ACNC: 1.08 MU/L (ref 0.4–4)

## 2018-08-09 DIAGNOSIS — R10.84 ABDOMINAL PAIN, GENERALIZED: ICD-10-CM

## 2018-08-09 DIAGNOSIS — R11.0 NAUSEA: ICD-10-CM

## 2018-08-09 PROCEDURE — 87177 OVA AND PARASITES SMEARS: CPT | Performed by: NURSE PRACTITIONER

## 2018-08-09 PROCEDURE — 87209 SMEAR COMPLEX STAIN: CPT | Performed by: NURSE PRACTITIONER

## 2018-08-09 PROCEDURE — 87338 HPYLORI STOOL AG IA: CPT | Performed by: NURSE PRACTITIONER

## 2018-08-10 ENCOUNTER — TELEPHONE (OUTPATIENT)
Dept: FAMILY MEDICINE | Facility: CLINIC | Age: 58
End: 2018-08-10

## 2018-08-10 DIAGNOSIS — E55.9 VITAMIN D DEFICIENCY: Primary | ICD-10-CM

## 2018-08-10 LAB
H PYLORI AG STL QL IA: ABNORMAL
O+P STL MICRO: ABNORMAL
O+P STL MICRO: ABNORMAL
SPECIMEN SOURCE: ABNORMAL
SPECIMEN SOURCE: ABNORMAL

## 2018-08-10 RX ORDER — ERGOCALCIFEROL 1.25 MG/1
50000 CAPSULE, LIQUID FILLED ORAL
Qty: 8 CAPSULE | Refills: 0 | Status: SHIPPED | OUTPATIENT
Start: 2018-08-10 | End: 2018-09-29

## 2018-08-10 NOTE — TELEPHONE ENCOUNTER
Per provider message on 08/07/18- rx sent to pharmacy for Vit D 22322 units. Per conversation with daughter, requesting that prescription be sent to [facility] pharmacy.    Evangelina Flaherty RN  [facility]

## 2018-08-13 NOTE — PROGRESS NOTES
SUBJECTIVE:   Luzmaria Sinha is a 57 year old female who presents to clinic today for the following health issues:    Here to go over recent lab results.   She is wondering if she has any deficiencies because has quivering in her stomach and veins feel funny, mucous in throat  Feels trouble with her urine at times, no dysuria but wondering if this could be DM  Last labs were nonfasting  See also last visit for ongoing indigestion issues, now here for results and protozoa nonpathologic, does have h pyloir     Results for orders placed or performed in visit on 08/09/18   H Pylori antigen stool   Result Value Ref Range    Specimen Description Feces     H Pylori Antigen (A)      Positive for Helicobacter pylori antigen by enzyme immunoassay. A positive result   indicates the presence of H. pylori antigen.     Ova and Parasite Exam Routine   Result Value Ref Range    Specimen Description Feces     Ova and Parasite Exam Endolimax neo trophozoites  and cysts   (A)     Ova and Parasite Exam       Cryptosporidium, Cyclospora, and Microsporidia are not readily detected by this method. A   single negative specimen does not rule out parasitic infection.      Results for LUZMARIA SINHA (MRN [id number]) as of 8/14/2018 15:08   Ref. Range 8/7/2018 15:17   Sodium Latest Ref Range: 133 - 144 mmol/L 141   Potassium Latest Ref Range: 3.4 - 5.3 mmol/L 4.0   Chloride Latest Ref Range: 94 - 109 mmol/L 105   Carbon Dioxide Latest Ref Range: 20 - 32 mmol/L 26   Urea Nitrogen Latest Ref Range: 7 - 30 mg/dL 12   Creatinine Latest Ref Range: 0.52 - 1.04 mg/dL 0.76   GFR Estimate Latest Ref Range: >60 mL/min/1.7m2 78   GFR Estimate If Black Latest Ref Range: >60 mL/min/1.7m2 >90   Calcium Latest Ref Range: 8.5 - 10.1 mg/dL 9.1   Anion Gap Latest Ref Range: 3 - 14 mmol/L 10   TSH Latest Ref Range: 0.40 - 4.00 mU/L 1.08   Vitamin B12 Latest Ref Range: 193 - 986 pg/mL 452   Vitamin D Deficiency screening Latest Ref Range: 20 - 75 ug/L 18 (L)    Glucose Latest Ref Range: 70 - 99 mg/dL 116 (H)   WBC Latest Ref Range: 4.0 - 11.0 10e9/L 4.9   Hemoglobin Latest Ref Range: 11.7 - 15.7 g/dL 14.2   Hematocrit Latest Ref Range: 35.0 - 47.0 % 42.6   Platelet Count Latest Ref Range: 150 - 450 10e9/L 297   RBC Count Latest Ref Range: 3.8 - 5.2 10e12/L 4.86   MCV Latest Ref Range: 78 - 100 fl 88   MCH Latest Ref Range: 26.5 - 33.0 pg 29.2   MCHC Latest Ref Range: 31.5 - 36.5 g/dL 33.3   RDW Latest Ref Range: 10.0 - 15.0 % 14.8   Diff Method Unknown Automated Method   % Neutrophils Latest Units: % 49.4   % Lymphocytes Latest Units: % 40.0   % Monocytes Latest Units: % 8.8   % Eosinophils Latest Units: % 1.2   % Basophils Latest Units: % 0.6   Absolute Neutrophil Latest Ref Range: 1.6 - 8.3 10e9/L 2.4   Absolute Lymphocytes Latest Ref Range: 0.8 - 5.3 10e9/L 2.0   Absolute Monocytes Latest Ref Range: 0.0 - 1.3 10e9/L 0.4   Absolute Eosinophils Latest Ref Range: 0.0 - 0.7 10e9/L 0.1   Absolute Basophils Latest Ref Range: 0.0 - 0.2 10e9/L 0.0     Results from 8/1 she also wanted to review again:   Ref. Range 8/1/2018 12:13   Color Urine Unknown Yellow   Appearance Urine Unknown Clear   Glucose Urine Latest Ref Range: NEG^Negative mg/dL Negative   Bilirubin Urine Latest Ref Range: NEG^Negative  Negative   Ketones Urine Latest Ref Range: NEG^Negative mg/dL Negative   Specific Gravity Urine Latest Ref Range: 1.003 - 1.035  1.010   pH Urine Latest Ref Range: 5.0 - 7.0 pH 6.0   Protein Albumin Urine Latest Ref Range: NEG^Negative mg/dL Negative   Urobilinogen Urine Latest Ref Range: 0.2 - 1.0 EU/dL 0.2   Nitrite Urine Latest Ref Range: NEG^Negative  Negative   Blood Urine Latest Ref Range: NEG^Negative  Negative   Leukocyte Esterase Urine Latest Ref Range: NEG^Negative  Negative   Source Unknown Midstream Urine       PROBLEMS TO ADD ON...    Problem list and histories reviewed & adjusted, as indicated.  Additional history: as documented    Patient Active Problem List    Diagnosis     Latent tuberculosis by blood test     Body mass index 34.0-34.9, adult     Duodenal ulcer due to Helicobacter pylori     No past surgical history on file.    Social History   Substance Use Topics     Smoking status: Never Smoker     Smokeless tobacco: Never Used     Alcohol use No     Family History   Problem Relation Age of Onset     Diabetes Father      Diabetes Brother          Current Outpatient Prescriptions   Medication Sig Dispense Refill     amoxicillin (AMOXIL) 500 MG capsule Take 2 capsules (1,000 mg) by mouth 2 times daily for 14 days 56 capsule 0     cetirizine (ZYRTEC) 10 MG tablet Take 1 tablet (10 mg) by mouth every evening 30 tablet 1     cholecalciferol (VITAMIN D3) 91808 units capsule Take 1 capsule (50,000 Units) by mouth once a week 8 capsule 0     clarithromycin (BIAXIN) 500 MG tablet Take 1 tablet (500 mg) by mouth 2 times daily for 14 days 28 tablet 0     omeprazole (PRILOSEC) 20 MG CR capsule Take 2 capsules (40 mg) by mouth 2 times daily 90 capsule 1     ranitidine (ZANTAC) 150 MG tablet Take 150 mg by mouth daily as needed       vitamin D (ERGOCALCIFEROL) 68042 UNIT capsule Take 1 capsule (50,000 Units) by mouth every 7 days for 8 doses After 8 weeks, then take Vit D3 5000 international units, 1 tablet daily. (Available over the counter) 8 capsule 0     Allergies   Allergen Reactions     Food      Beans, chicken and certain types of fish      Recent Labs   Lab Test  08/07/18   1517   CR  0.76   GFRESTIMATED  78   GFRESTBLACK  >90   POTASSIUM  4.0   TSH  1.08      BP Readings from Last 3 Encounters:   08/14/18 118/70   08/07/18 120/60   08/01/18 130/70    Wt Readings from Last 3 Encounters:   08/14/18 208 lb 12.8 oz (94.7 kg)   08/07/18 208 lb (94.3 kg)   08/01/18 208 lb (94.3 kg)                  Labs reviewed in EPIC    Reviewed and updated as needed this visit by clinical staff       Reviewed and updated as needed this visit by Provider         ROS:  Constitutional, HEENT,  cardiovascular, pulmonary, GI, , musculoskeletal, neuro, skin, endocrine and psych systems are negative, except as otherwise noted.    OBJECTIVE:     /70 (BP Location: Right arm, Patient Position: Sitting, Cuff Size: Adult Large)  Pulse 66  Temp 98.7  F (37.1  C) (Oral)  Wt 208 lb 12.8 oz (94.7 kg)  SpO2 100%  BMI 34.37 kg/m2  Body mass index is 34.37 kg/(m^2).  GENERAL: healthy, alert and no distress  RESP: Respiratory rate regular and breathing easily   CV: No abnormal color and extremities warm   NEURO: Normal strength and tone, mentation intact and speech normal    Diagnostic Test Results:  No results found for this or any previous visit (from the past 24 hour(s)).    ASSESSMENT/PLAN:         ICD-10-CM    1. Vitamin D deficiency E55.9 cholecalciferol (VITAMIN D3) 94882 units capsule   2. Screen for colon cancer Z12.11 GASTROENTEROLOGY ADULT REF PROCEDURE ONLY   3. Visit for screening mammogram Z12.31 MA SCREENING DIGITAL BILAT - Future  (s+30)   4. Screening for malignant neoplasm of cervix Z12.4    5. Need for hepatitis C screening test Z11.59 Hepatitis C Screen Reflex to HCV RNA Quant and Genotype   6. Screening for HIV (human immunodeficiency virus) Z11.4 HIV Screening   7. Screening cholesterol level Z13.220 Lipid panel reflex to direct LDL Fasting   8. H. pylori infection A04.8 clarithromycin (BIAXIN) 500 MG tablet     amoxicillin (AMOXIL) 500 MG capsule     omeprazole (PRILOSEC) 20 MG CR capsule   pt repeating a lot of similar symptoms from last visit, non acute and discussed lab work in detail today, many questions and answered all through . She will consider colonoscopy and mammogram, pap, does want her anatomy checked out but she is not sure she wants a pelvic, discussed in detail and recommended she return for health maintenance exam, 1-2 weeks or so in order to follow up on how she is doing with the below  Come fasting for lab work and can check a1c at that time  Antibiotics  take entire course and do not miss doses, ppi     Vit D replacement weekly for 8 weeks and then 5000 units daily     Patient Instructions       Understanding H. pylori and Ulcers     An ulcer can form in two areas of the digestive tract; the stomach and the duodenum (where the stomach meets the small intestine).     Traditionally, ulcers, or sores in the lining of your digestive tract, were thought to be caused by too much spicy food, stress, or an anxious personality. We now know that most ulcers are probably due to infection with bacteria known as Helicobacter pylori (H. pylori).  Common ulcer symptoms  These include the following:    Burning, cramping, or hungerlike pain in the stomach area, often 1 to 3 hours after a meal or in the middle of the night    Pain that gets better or worse with eating    Nausea or vomiting    Black, tarry, or bloody stools (which means the ulcer is bleeding)  Or you may have no symptoms.  Your evaluation  An evaluation by your healthcare provider can show if you have an ulcer and determine whether it was caused by H. pylori. Your healthcare provider may ask you questions, examine you, and possibly do some tests. These may include:    A special X-ray called an upper gastrointestinal series, to help locate an ulcer. Before the test, you drink a chalky liquid, called barium. This liquid helps the ulcer show up on the X-ray.    An endoscopic exam, done with a long tube with a camera on the end. The tube is passed through your mouth into your stomach, and allows the healthcare provider to get a closer look at your ulcer. You will be lightly sedated for this procedure. Your healthcare provider can also take a tissue sample to test for H. pylori.    Blood, stool, and breath tests are also available to show whether you have H. pylori in your digestive tract.  Your treatment  To kill H. pylori so your ulcer can heal, your healthcare provider will probably prescribe antibiotics. Other ulcer  medicines that help reduce stomach acid may also be prescribed as well. Testing after treatment may be recommended to be sure the H. pylori infection is gone. Usually, killing H. pylori helps keep the ulcer from returning. However, you can develop ulcers more than once in your lifetime.   Date Last Reviewed: 7/1/2016 2000-2017 [employer]. [address] [place] [address], [place] [address]. All rights reserved. This information is not intended as a substitute for professional medical care. Always follow your healthcare professional's instructions.        35 min spent in direct face to face time with this pt, greater than 50% in counseling and coordination of care of above diagnoses      RANDALL Mendoza [facility]

## 2018-08-14 ENCOUNTER — OFFICE VISIT (OUTPATIENT)
Dept: FAMILY MEDICINE | Facility: CLINIC | Age: 58
End: 2018-08-14
Payer: COMMERCIAL

## 2018-08-14 VITALS
SYSTOLIC BLOOD PRESSURE: 118 MMHG | TEMPERATURE: 98.7 F | WEIGHT: 208.8 LBS | HEART RATE: 66 BPM | OXYGEN SATURATION: 100 % | DIASTOLIC BLOOD PRESSURE: 70 MMHG | BODY MASS INDEX: 34.37 KG/M2

## 2018-08-14 DIAGNOSIS — Z12.31 VISIT FOR SCREENING MAMMOGRAM: ICD-10-CM

## 2018-08-14 DIAGNOSIS — Z12.4 SCREENING FOR MALIGNANT NEOPLASM OF CERVIX: ICD-10-CM

## 2018-08-14 DIAGNOSIS — Z11.59 NEED FOR HEPATITIS C SCREENING TEST: ICD-10-CM

## 2018-08-14 DIAGNOSIS — E55.9 VITAMIN D DEFICIENCY: Primary | ICD-10-CM

## 2018-08-14 DIAGNOSIS — Z11.4 SCREENING FOR HIV (HUMAN IMMUNODEFICIENCY VIRUS): ICD-10-CM

## 2018-08-14 DIAGNOSIS — Z12.11 SCREEN FOR COLON CANCER: ICD-10-CM

## 2018-08-14 DIAGNOSIS — Z13.220 SCREENING CHOLESTEROL LEVEL: ICD-10-CM

## 2018-08-14 DIAGNOSIS — A04.8 H. PYLORI INFECTION: ICD-10-CM

## 2018-08-14 PROBLEM — B96.81 DUODENAL ULCER DUE TO HELICOBACTER PYLORI: Status: ACTIVE | Noted: 2018-08-14

## 2018-08-14 PROBLEM — K26.9 DUODENAL ULCER DUE TO HELICOBACTER PYLORI: Status: ACTIVE | Noted: 2018-08-14

## 2018-08-14 PROCEDURE — 99214 OFFICE O/P EST MOD 30 MIN: CPT | Performed by: NURSE PRACTITIONER

## 2018-08-14 RX ORDER — AMOXICILLIN 500 MG/1
1000 CAPSULE ORAL 2 TIMES DAILY
Qty: 56 CAPSULE | Refills: 0 | Status: SHIPPED | OUTPATIENT
Start: 2018-08-14 | End: 2018-08-28

## 2018-08-14 RX ORDER — CLARITHROMYCIN 500 MG
500 TABLET ORAL 2 TIMES DAILY
Qty: 28 TABLET | Refills: 0 | Status: SHIPPED | OUTPATIENT
Start: 2018-08-14 | End: 2018-08-28

## 2018-08-14 NOTE — MR AVS SNAPSHOT
After Visit Summary   8/14/2018    Zoey Sinha    MRN: [id number]           Patient Information     Date Of Birth          1960        Visit Information        Provider Department      8/14/2018 1:45 PM Holly Salas APRN CNP; [facility] [place] [facility] [place] [facility] [place]'s Diagnoses     Vitamin D deficiency    -  1    Screen for colon cancer        Visit for screening mammogram        Screening for malignant neoplasm of cervix        Need for hepatitis C screening test        Screening for HIV (human immunodeficiency virus)        Screening cholesterol level        H. pylori infection          Care Instructions      Understanding H. pylori and Ulcers     An ulcer can form in two areas of the digestive tract; the stomach and the duodenum (where the stomach meets the small intestine).     Traditionally, ulcers, or sores in the lining of your digestive tract, were thought to be caused by too much spicy food, stress, or an anxious personality. We now know that most ulcers are probably due to infection with bacteria known as Helicobacter pylori (H. pylori).  Common ulcer symptoms  These include the following:    Burning, cramping, or hungerlike pain in the stomach area, often 1 to 3 hours after a meal or in the middle of the night    Pain that gets better or worse with eating    Nausea or vomiting    Black, tarry, or bloody stools (which means the ulcer is bleeding)  Or you may have no symptoms.  Your evaluation  An evaluation by your healthcare provider can show if you have an ulcer and determine whether it was caused by H. pylori. Your healthcare provider may ask you questions, examine you, and possibly do some tests. These may include:    A special X-ray called an upper gastrointestinal series, to help locate an ulcer. Before the test, you drink a chalky liquid, called barium. This liquid helps the ulcer show up on the X-ray.    An endoscopic exam, done with a long tube with  a camera on the end. The tube is passed through your mouth into your stomach, and allows the healthcare provider to get a closer look at your ulcer. You will be lightly sedated for this procedure. Your healthcare provider can also take a tissue sample to test for H. pylori.    Blood, stool, and breath tests are also available to show whether you have H. pylori in your digestive tract.  Your treatment  To kill H. pylori so your ulcer can heal, your healthcare provider will probably prescribe antibiotics. Other ulcer medicines that help reduce stomach acid may also be prescribed as well. Testing after treatment may be recommended to be sure the H. pylori infection is gone. Usually, killing H. pylori helps keep the ulcer from returning. However, you can develop ulcers more than once in your lifetime.   Date Last Reviewed: 7/1/2016 2000-2017 [employer]. [address] [place] [address]. All rights reserved. This information is not intended as a substitute for professional medical care. Always follow your healthcare professional's instructions.                Follow-ups after your visit        Additional Services     GASTROENTEROLOGY ADULT REF PROCEDURE ONLY       Last Lab Result: Creatinine (mg/dL)       Date                     Value                 08/07/2018               0.76             ----------  There is no height or weight on file to calculate BMI.     Needed:  Yes  Language:  Norwegian    Patient will be contacted to schedule procedure.     Please be aware that coverage of these services is subject to the terms and limitations of your health insurance plan.  Call member services at your health plan with any benefit or coverage questions.  Any procedures must be performed at a [facility] facility OR coordinated by your clinic's referral office.    Please bring the following with you to your appointment:    (1) Any X-Rays, CTs or MRIs which have been performed.  Contact the  facility where they were done to arrange for  prior to your scheduled appointment.    (2) List of current medications   (3) This referral request   (4) Any documents/labs given to you for this referral                  Future tests that were ordered for you today     Open Future Orders        Priority Expected Expires Ordered    MA SCREENING DIGITAL BILAT - Future  (s+30) Routine  8/13/2019 8/14/2018    Hepatitis C Screen Reflex to HCV RNA Quant and Genotype Routine  8/14/2019 8/14/2018    HIV Screening Routine  8/14/2019 8/14/2018    Lipid panel reflex to direct LDL Fasting Routine  8/14/2019 8/14/2018            Who to contact     If you have questions or need follow up information about today's clinic visit or your schedule please contact [facility] [place] directly at [phone].  Normal or non-critical lab and imaging results will be communicated to you by MyChart, letter or phone within 4 business days after the clinic has received the results. If you do not hear from us within 7 days, please contact the clinic through MyChart or phone. If you have a critical or abnormal lab result, we will notify you by phone as soon as possible.  Submit refill requests through [employer] or call your pharmacy and they will forward the refill request to us. Please allow 3 business days for your refill to be completed.          Additional Information About Your Visit        Care EveryWhere ID     This is your Care EveryWhere ID. This could be used by other organizations to access your [facility] medical records  [id number]        Your Vitals Were     Pulse Temperature Pulse Oximetry BMI (Body Mass Index)          66 98.7  F (37.1  C) (Oral) 100% 34.37 kg/m2         Blood Pressure from Last 3 Encounters:   08/14/18 140/74   08/07/18 120/60   08/01/18 130/70    Weight from Last 3 Encounters:   08/14/18 208 lb 12.8 oz (94.7 kg)   08/07/18 208 lb (94.3 kg)   08/01/18 208 lb (94.3 kg)              We Performed the  Following     GASTROENTEROLOGY ADULT REF PROCEDURE ONLY          Today's Medication Changes          These changes are accurate as of 8/14/18  2:45 PM.  If you have any questions, ask your nurse or doctor.               Start taking these medicines.        Dose/Directions    amoxicillin 500 MG capsule   Commonly known as:  AMOXIL   Used for:  H. pylori infection   Started by:  Holly Salas APRN CNP        Dose:  1000 mg   Take 2 capsules (1,000 mg) by mouth 2 times daily for 14 days   Quantity:  56 capsule   Refills:  0       cholecalciferol 06087 units capsule   Commonly known as:  VITAMIN D3   Used for:  Vitamin D deficiency   Started by:  Holly Salas APRN CNP        Dose:  1 capsule   Take 1 capsule (50,000 Units) by mouth once a week   Quantity:  8 capsule   Refills:  0       clarithromycin 500 MG tablet   Commonly known as:  BIAXIN   Used for:  H. pylori infection   Started by:  Holly Salas APRN CNP        Dose:  500 mg   Take 1 tablet (500 mg) by mouth 2 times daily for 14 days   Quantity:  28 tablet   Refills:  0       omeprazole 20 MG CR capsule   Commonly known as:  priLOSEC   Used for:  H. pylori infection   Started by:  Holly Salas APRN CNP        Dose:  40 mg   Take 2 capsules (40 mg) by mouth 2 times daily   Quantity:  90 capsule   Refills:  1         These medicines have changed or have updated prescriptions.        Dose/Directions    cetirizine 10 MG tablet   Commonly known as:  zyrTEC   This may have changed:  Another medication with the same name was removed. Continue taking this medication, and follow the directions you see here.   Used for:  PND (post-nasal drip)   Changed by:  Holly Salas APRN CNP        Dose:  10 mg   Take 1 tablet (10 mg) by mouth every evening   Quantity:  30 tablet   Refills:  1            Where to get your medicines      These medications were sent to [place], [place] - [address]  [address], [facility] [place] [address]     Phone:  [phone]     amoxicillin 500 MG capsule    cholecalciferol 56601 units capsule    clarithromycin 500 MG tablet    omeprazole 20 MG CR capsule                Primary Care Provider Fax #    Physician No Ref-Primary [phone]       No address on file        Equal Access to Services     THEAFARIHA ALVARO : Hadii isai torres dodiefrancisco Somimi, waaxda luqadaha, qaybta kaalmada adechris, mikal rivas sarahjorge gamble kaiabrian quiroz. So [facility] [phone].    ATENCIÓN: Si habla español, tiene a wright disposición servicios gratuitos de asistencia lingüística. Llame al [phone].    We comply with applicable federal civil rights laws and [place] laws. We do not discriminate on the basis of race, color, national origin, age, disability, sex, sexual orientation, or gender identity.            Thank you!     Thank you for choosing [facility]  for your care. Our goal is always to provide you with excellent care. Hearing back from our patients is one way we can continue to improve our services. Please take a few minutes to complete the written survey that you may receive in the mail after your visit with us. Thank you!             Your Updated Medication List - Protect others around you: Learn how to safely use, store and throw away your medicines at www.disposemymeds.org.          This list is accurate as of 8/14/18  2:45 PM.  Always use your most recent med list.                   Brand Name Dispense Instructions for use Diagnosis    amoxicillin 500 MG capsule    AMOXIL    56 capsule    Take 2 capsules (1,000 mg) by mouth 2 times daily for 14 days    H. pylori infection       cetirizine 10 MG tablet    zyrTEC    30 tablet    Take 1 tablet (10 mg) by mouth every evening    PND (post-nasal drip)       cholecalciferol 05500 units capsule    VITAMIN D3    8 capsule    Take 1 capsule (50,000 Units) by mouth once a week    Vitamin D deficiency       clarithromycin 500 MG tablet     BIAXIN    28 tablet    Take 1 tablet (500 mg) by mouth 2 times daily for 14 days    H. pylori infection       omeprazole 20 MG CR capsule    priLOSEC    90 capsule    Take 2 capsules (40 mg) by mouth 2 times daily    H. pylori infection       ranitidine 150 MG tablet    ZANTAC     Take 150 mg by mouth daily as needed        vitamin D 93607 UNIT capsule    ERGOCALCIFEROL    8 capsule    Take 1 capsule (50,000 Units) by mouth every 7 days for 8 doses After 8 weeks, then take Vit D3 5000 international units, 1 tablet daily. (Available over the counter)    Vitamin D deficiency

## 2018-08-14 NOTE — PATIENT INSTRUCTIONS
Understanding H. pylori and Ulcers     An ulcer can form in two areas of the digestive tract; the stomach and the duodenum (where the stomach meets the small intestine).     Traditionally, ulcers, or sores in the lining of your digestive tract, were thought to be caused by too much spicy food, stress, or an anxious personality. We now know that most ulcers are probably due to infection with bacteria known as Helicobacter pylori (H. pylori).  Common ulcer symptoms  These include the following:    Burning, cramping, or hungerlike pain in the stomach area, often 1 to 3 hours after a meal or in the middle of the night    Pain that gets better or worse with eating    Nausea or vomiting    Black, tarry, or bloody stools (which means the ulcer is bleeding)  Or you may have no symptoms.  Your evaluation  An evaluation by your healthcare provider can show if you have an ulcer and determine whether it was caused by H. pylori. Your healthcare provider may ask you questions, examine you, and possibly do some tests. These may include:    A special X-ray called an upper gastrointestinal series, to help locate an ulcer. Before the test, you drink a chalky liquid, called barium. This liquid helps the ulcer show up on the X-ray.    An endoscopic exam, done with a long tube with a camera on the end. The tube is passed through your mouth into your stomach, and allows the healthcare provider to get a closer look at your ulcer. You will be lightly sedated for this procedure. Your healthcare provider can also take a tissue sample to test for H. pylori.    Blood, stool, and breath tests are also available to show whether you have H. pylori in your digestive tract.  Your treatment  To kill H. pylori so your ulcer can heal, your healthcare provider will probably prescribe antibiotics. Other ulcer medicines that help reduce stomach acid may also be prescribed as well. Testing after treatment may be recommended to be sure the H. pylori  infection is gone. Usually, killing H. pylori helps keep the ulcer from returning. However, you can develop ulcers more than once in your lifetime.   Date Last Reviewed: 7/1/2016 2000-2017 The [employer]. [address] [place] [address], [place] [address]. All rights reserved. This information is not intended as a substitute for professional medical care. Always follow your healthcare professional's instructions.

## 2018-08-28 NOTE — PROGRESS NOTES
SUBJECTIVE:   Zoey Sinha is a 57 year old female who presents to clinic today for the following health issues:    Medication Followup of Amoxicillin for H-Pylori    Taking Medication as prescribed: yes    Side Effects:  Felt nauseated and headaches at the beginning. Then this went away after a few days of taking the medications     Medication Helping Symptoms:  Still feels bloated in her stomach     Also wants to review Tb testing results, she has never heard she has LTBI    Exam: XR CHEST 2 VW, 8/1/2018 1:23 PM     Indication: ; Positive QuantiFERON-TB Gold test; Chills; Night sweats     Comparison: None     Findings:   Heart and pulmonary vasculature is within normal limits. The lungs are  clear. Bones unremarkable. Upper abdomen unremarkable.         Impression: No evidence for tuberculosis, radiographically.     MICHELLE ABRAHAM MD     Has not had any changing symptoms   Questions about latent Tb as she was never told she had this, never was treated, does not think she would like to treat this at this time    Also questions again about parasite non pathogenic: Endolimax neo trophozoites   and cysts     [facility] nicollet  2/9/2018    FIT test negative       2/9/2018  [facility]  Component Name Value Ref Range   O&P SPECIMEN DESCRIPTION Stool   Comment: CLIA Number [id number]     SPECIAL REQUESTS Unspec   Comment: CLIA Number [id number]     O&P SPECIMEN NUMBER 1   Comment: CLIA Number [id number]     O&P RESULTS Parasites Seen (A)   Comment:  Iodamoeba butschlii Cysts and Trophozoites Present  Endolimax neo Cysts and Trophozoites Present  Entamoeba hartmanni Cysts and Trophozoites Present  Entamoeba coli, Entamoeba hartmanni, Iodamoeba butschlii,  Endolimax neo and Chilomastix mesnili are intestinal  protozoans which are usually considered nonpathogenic and  transient.  Their presence can indicate the ingestion of  material with fecal contamination.  A further search for a  pathogen may be warranted if symptoms  persist.  Dientamoeba fragilis trophozoites Present  Moderate Blastocystis hominis Present  Blastocystis hominis has been associated with a persistent  mild diarrhea with only occasional patients developing  clinical symptoms. It may be suspected when other pathogens  have been ruled out and B. hominis is numerous. Also  associated with compromised patients.  [facility] [address] [id number]          PROBLEMS TO ADD ON...  See above, also overweight and wants elevated glucose checked for DM, no symptoms other than GI issues above due to h pylori     Problem list and histories reviewed & adjusted, as indicated.  Additional history: as documented    Patient Active Problem List   Diagnosis     Latent tuberculosis by blood test     Body mass index 34.0-34.9, adult     Duodenal ulcer due to Helicobacter pylori     No past surgical history on file.    Social History   Substance Use Topics     Smoking status: Never Smoker     Smokeless tobacco: Never Used     Alcohol use No     Family History   Problem Relation Age of Onset     Diabetes Father      Diabetes Brother          Current Outpatient Prescriptions   Medication Sig Dispense Refill     cholecalciferol (VITAMIN D3) 86309 units capsule Take 1 capsule (50,000 Units) by mouth once a week 8 capsule 0     omeprazole (PRILOSEC) 20 MG CR capsule Take 2 capsules (40 mg) by mouth 2 times daily 90 capsule 1     ranitidine (ZANTAC) 150 MG tablet Take 1 tablet (150 mg) by mouth 2 times daily 60 tablet 1     cetirizine (ZYRTEC) 10 MG tablet Take 1 tablet (10 mg) by mouth every evening 30 tablet 1     vitamin D (ERGOCALCIFEROL) 00265 UNIT capsule Take 1 capsule (50,000 Units) by mouth every 7 days for 8 doses After 8 weeks, then take Vit D3 5000 international units, 1 tablet daily. (Available over the counter) 8 capsule 0     Allergies   Allergen Reactions     Food      Beans, chicken and certain  types of fish      Recent Labs   Lab Test  08/30/18   1356  08/07/18   1517   A1C  5.4   --    ALT  29   --    CR  0.72  0.76   GFRESTIMATED  83  78   GFRESTBLACK  >90  >90   POTASSIUM  3.6  4.0   TSH   --   1.08      BP Readings from Last 3 Encounters:   08/30/18 134/62   08/14/18 118/70   08/07/18 120/60    Wt Readings from Last 3 Encounters:   08/30/18 207 lb 8 oz (94.1 kg)   08/14/18 208 lb 12.8 oz (94.7 kg)   08/07/18 208 lb (94.3 kg)                  Labs reviewed in EPIC    Reviewed and updated as needed this visit by clinical staff       Reviewed and updated as needed this visit by Provider         ROS:  Constitutional, HEENT, cardiovascular, pulmonary, GI, , musculoskeletal, neuro, skin, endocrine and psych systems are negative, except as otherwise noted.    OBJECTIVE:     /62  Pulse 80  Temp 97.7  F (36.5  C) (Oral)  Resp 16  Wt 207 lb 8 oz (94.1 kg)  SpO2 100%  BMI 34.16 kg/m2  Body mass index is 34.16 kg/(m^2).  GENERAL: healthy, alert and no distress  RESP: lungs clear to auscultation - no rales, rhonchi or wheezes  CV: regular rate and rhythm, normal S1 S2, no S3 or S4, no murmur, click or rub, no peripheral edema and peripheral pulses strong  ABDOMEN: soft, mild generalized tenderness, no hepatosplenomegaly, no masses and bowel sounds normal, no McBurney or rebound tenderness, negative Dudley's    MS: no gross musculoskeletal defects noted, no edema  SKIN: no suspicious lesions or rashes  NEURO: Normal strength and tone, mentation intact and speech normal    Diagnostic Test Results:  Results for orders placed or performed in visit on 08/30/18   Hemoglobin A1c   Result Value Ref Range    Hemoglobin A1C 5.4 0 - 5.6 %   Comprehensive metabolic panel   Result Value Ref Range    Sodium 140 133 - 144 mmol/L    Potassium 3.6 3.4 - 5.3 mmol/L    Chloride 104 94 - 109 mmol/L    Carbon Dioxide 27 20 - 32 mmol/L    Anion Gap 9 3 - 14 mmol/L    Glucose 106 (H) 70 - 99 mg/dL    Urea Nitrogen 9 7 - 30  mg/dL    Creatinine 0.72 0.52 - 1.04 mg/dL    GFR Estimate 83 >60 mL/min/1.7m2    GFR Estimate If Black >90 >60 mL/min/1.7m2    Calcium 9.3 8.5 - 10.1 mg/dL    Bilirubin Total 0.4 0.2 - 1.3 mg/dL    Albumin 4.0 3.4 - 5.0 g/dL    Protein Total 7.5 6.8 - 8.8 g/dL    Alkaline Phosphatase 90 40 - 150 U/L    ALT 29 0 - 50 U/L    AST 27 0 - 45 U/L       ASSESSMENT/PLAN:         ICD-10-CM    1. Gastroesophageal reflux disease without esophagitis K21.9 ranitidine (ZANTAC) 150 MG tablet   2. Elevated glucose R73.09 Hemoglobin A1c   3. Latent tuberculosis R76.11 Comprehensive metabolic panel   4. Duodenal ulcer due to Helicobacter pylori K26.9 Comprehensive metabolic panel    B96.81    discussed benefits and risks fo treating LTBI and pt opts to not treat at this time. Agree not best time since treating h pylori  Parasite nonpathogenic, long discussion regarding h pyori and reviewed treatment again in detail, many questions by pt and dtr answered today via . Continue plan. Continue ppi for another month.   Labs done and a1c was normal today, work on lifestyle and will check fasting lab work at her health maintenance exam    Return to Clinic for health maintenance exam at her convenience, Return to Clinic sooner RANDALL Church CNP  [facility] [place]

## 2018-08-30 ENCOUNTER — OFFICE VISIT (OUTPATIENT)
Dept: FAMILY MEDICINE | Facility: CLINIC | Age: 58
End: 2018-08-30
Payer: COMMERCIAL

## 2018-08-30 VITALS
HEART RATE: 80 BPM | BODY MASS INDEX: 34.16 KG/M2 | RESPIRATION RATE: 16 BRPM | SYSTOLIC BLOOD PRESSURE: 134 MMHG | WEIGHT: 207.5 LBS | TEMPERATURE: 97.7 F | OXYGEN SATURATION: 100 % | DIASTOLIC BLOOD PRESSURE: 62 MMHG

## 2018-08-30 DIAGNOSIS — R73.09 ELEVATED GLUCOSE: ICD-10-CM

## 2018-08-30 DIAGNOSIS — Z22.7 LATENT TUBERCULOSIS: ICD-10-CM

## 2018-08-30 DIAGNOSIS — B96.81 DUODENAL ULCER DUE TO HELICOBACTER PYLORI: ICD-10-CM

## 2018-08-30 DIAGNOSIS — K26.9 DUODENAL ULCER DUE TO HELICOBACTER PYLORI: ICD-10-CM

## 2018-08-30 DIAGNOSIS — K21.9 GASTROESOPHAGEAL REFLUX DISEASE WITHOUT ESOPHAGITIS: Primary | ICD-10-CM

## 2018-08-30 LAB — HBA1C MFR BLD: 5.4 % (ref 0–5.6)

## 2018-08-30 PROCEDURE — 99214 OFFICE O/P EST MOD 30 MIN: CPT | Performed by: NURSE PRACTITIONER

## 2018-08-30 PROCEDURE — 83036 HEMOGLOBIN GLYCOSYLATED A1C: CPT | Performed by: NURSE PRACTITIONER

## 2018-08-30 PROCEDURE — 36415 COLL VENOUS BLD VENIPUNCTURE: CPT | Performed by: NURSE PRACTITIONER

## 2018-08-30 PROCEDURE — 80053 COMPREHEN METABOLIC PANEL: CPT | Performed by: NURSE PRACTITIONER

## 2018-08-30 NOTE — MR AVS SNAPSHOT
After Visit Summary   8/30/2018    Zoey Sinha    MRN: [id number]           Patient Information     Date Of Birth          1960        Visit Information        Provider Department      8/30/2018 1:25 PM Holly Salas APRN CNP; [employer] [facility]        Today's Diagnoses     Gastroesophageal reflux disease without esophagitis    -  1    Elevated glucose        Latent tuberculosis        Duodenal ulcer due to Helicobacter pylori           Follow-ups after your visit        Who to contact     If you have questions or need follow up information about today's clinic visit or your schedule please contact [facility] directly at [phone].  Normal or non-critical lab and imaging results will be communicated to you by MyChart, letter or phone within 4 business days after the clinic has received the results. If you do not hear from us within 7 days, please contact the clinic through MyChart or phone. If you have a critical or abnormal lab result, we will notify you by phone as soon as possible.  Submit refill requests through [employer] or call your pharmacy and they will forward the refill request to us. Please allow 3 business days for your refill to be completed.          Additional Information About Your Visit        Care EveryWhere ID     This is your Care EveryWhere ID. This could be used by other organizations to access your [place] medical records  [id number]        Your Vitals Were     Pulse Temperature Respirations Pulse Oximetry BMI (Body Mass Index)       80 97.7  F (36.5  C) (Oral) 16 100% 34.16 kg/m2        Blood Pressure from Last 3 Encounters:   08/30/18 134/62   08/14/18 118/70   08/07/18 120/60    Weight from Last 3 Encounters:   08/30/18 207 lb 8 oz (94.1 kg)   08/14/18 208 lb 12.8 oz (94.7 kg)   08/07/18 208 lb (94.3 kg)              We Performed the Following     Comprehensive metabolic panel     Hemoglobin A1c          Today's  Medication Changes          These changes are accurate as of 8/30/18 11:59 PM.  If you have any questions, ask your nurse or doctor.               These medicines have changed or have updated prescriptions.        Dose/Directions    ranitidine 150 MG tablet   Commonly known as:  ZANTAC   This may have changed:    - when to take this  - reasons to take this   Used for:  Gastroesophageal reflux disease without esophagitis   Changed by:  Holly Salas APRN CNP        Dose:  150 mg   Take 1 tablet (150 mg) by mouth 2 times daily   Quantity:  60 tablet   Refills:  1            Where to get your medicines      These medications were sent to [facility] [place], [place] - [address]  [address] [facility] [address], [facility] [address]     Phone:  [phone]     ranitidine 150 MG tablet                Primary Care Provider Fax #    Physician No Ref-Primary [phone]       No address on file        Equal Access to Services     SHAE JOHN : Hadii aad ku hadasho Somimi, waaxda luqadaha, qaybta kaalmada adetatoyada, mikal borja . So [facility] [phone].    ATENCIÓN: Si habla español, tiene a wright disposición servicios gratuitos de asistencia lingüística. Sammy al [phone].    We comply with applicable federal civil rights laws and [place] laws. We do not discriminate on the basis of race, color, national origin, age, disability, sex, sexual orientation, or gender identity.            Thank you!     Thank you for choosing [facility]  for your care. Our goal is always to provide you with excellent care. Hearing back from our patients is one way we can continue to improve our services. Please take a few minutes to complete the written survey that you may receive in the mail after your visit with us. Thank you!             Your Updated Medication List - Protect others around you: Learn how to safely use, store and throw away your medicines at  www.disposemymeds.org.          This list is accurate as of 8/30/18 11:59 PM.  Always use your most recent med list.                   Brand Name Dispense Instructions for use Diagnosis    cetirizine 10 MG tablet    zyrTEC    30 tablet    Take 1 tablet (10 mg) by mouth every evening    PND (post-nasal drip)       cholecalciferol 69464 units capsule    VITAMIN D3    8 capsule    Take 1 capsule (50,000 Units) by mouth once a week    Vitamin D deficiency       omeprazole 20 MG CR capsule    priLOSEC    90 capsule    Take 2 capsules (40 mg) by mouth 2 times daily    H. pylori infection       ranitidine 150 MG tablet    ZANTAC    60 tablet    Take 1 tablet (150 mg) by mouth 2 times daily    Gastroesophageal reflux disease without esophagitis       vitamin D 13670 UNIT capsule    ERGOCALCIFEROL    8 capsule    Take 1 capsule (50,000 Units) by mouth every 7 days for 8 doses After 8 weeks, then take Vit D3 5000 international units, 1 tablet daily. (Available over the counter)    Vitamin D deficiency

## 2018-08-31 LAB
ALBUMIN SERPL-MCNC: 4 G/DL (ref 3.4–5)
ALP SERPL-CCNC: 90 U/L (ref 40–150)
ALT SERPL W P-5'-P-CCNC: 29 U/L (ref 0–50)
ANION GAP SERPL CALCULATED.3IONS-SCNC: 9 MMOL/L (ref 3–14)
AST SERPL W P-5'-P-CCNC: 27 U/L (ref 0–45)
BILIRUB SERPL-MCNC: 0.4 MG/DL (ref 0.2–1.3)
BUN SERPL-MCNC: 9 MG/DL (ref 7–30)
CALCIUM SERPL-MCNC: 9.3 MG/DL (ref 8.5–10.1)
CHLORIDE SERPL-SCNC: 104 MMOL/L (ref 94–109)
CO2 SERPL-SCNC: 27 MMOL/L (ref 20–32)
CREAT SERPL-MCNC: 0.72 MG/DL (ref 0.52–1.04)
GFR SERPL CREATININE-BSD FRML MDRD: 83 ML/MIN/1.7M2
GLUCOSE SERPL-MCNC: 106 MG/DL (ref 70–99)
POTASSIUM SERPL-SCNC: 3.6 MMOL/L (ref 3.4–5.3)
PROT SERPL-MCNC: 7.5 G/DL (ref 6.8–8.8)
SODIUM SERPL-SCNC: 140 MMOL/L (ref 133–144)

## 2018-10-02 ENCOUNTER — OFFICE VISIT (OUTPATIENT)
Dept: FAMILY MEDICINE | Facility: CLINIC | Age: 58
End: 2018-10-02
Payer: COMMERCIAL

## 2018-10-02 VITALS
SYSTOLIC BLOOD PRESSURE: 138 MMHG | HEART RATE: 82 BPM | DIASTOLIC BLOOD PRESSURE: 78 MMHG | BODY MASS INDEX: 35.24 KG/M2 | OXYGEN SATURATION: 97 % | TEMPERATURE: 97.7 F | WEIGHT: 214.1 LBS

## 2018-10-02 DIAGNOSIS — R05.9 COUGH: Primary | ICD-10-CM

## 2018-10-02 PROCEDURE — 99214 OFFICE O/P EST MOD 30 MIN: CPT | Performed by: PHYSICIAN ASSISTANT

## 2018-10-02 RX ORDER — METHYLPREDNISOLONE 4 MG
TABLET, DOSE PACK ORAL
Qty: 21 TABLET | Refills: 0 | Status: SHIPPED | OUTPATIENT
Start: 2018-10-02

## 2018-10-02 NOTE — PROGRESS NOTES
SUBJECTIVE:   Zoey Sinha is a 57 year old female who presents to clinic today for the following health issues:    Cough  Acute Illness   Acute illness concerns: cough  Onset: 2 months    Fever: no    Chills/Sweats: no    Headache (location?): YES    Sinus Pressure:YES    Conjunctivitis:  YES- watery eyes    Ear Pain: YES: left     Rhinorrhea: no    Congestion: YES- very mild    Sore Throat: no     Cough: YES    Wheeze: no     Decreased Appetite: no    Nausea: no    Vomiting: no    Diarrhea:  no    Dysuria/Freq.: no    Fatigue/Achiness: no    Sick/Strep Exposure: no         Therapies Tried and outcome: eye drop that was prescribed after eye surgery only. Seems to trigger cough        Reports pain behind the eyes with coughing, had eye surgery on 9/11/18  The concern is that the cough is not improving. The pain behind the eyes is apparently normal according to the surgeon          Problem list and histories reviewed & adjusted, as indicated.  Additional history: as documented    ROS:  CONSTITUTIONAL: NEGATIVE for fever, chills, change in weight  INTEGUMENTARY/SKIN: NEGATIVE for worrisome rashes, moles or lesions  CV: NEGATIVE for chest pain, palpitations or peripheral edema  GI: NEGATIVE for nausea, abdominal pain, heartburn, or change in bowel habits  : NEGATIVE for frequency, dysuria, or hematuria  MUSCULOSKELETAL: NEGATIVE for significant arthralgias or myalgia  NEURO: NEGATIVE for weakness, dizziness or paresthesias  ENDOCRINE: NEGATIVE for temperature intolerance, skin/hair changes  HEME: NEGATIVE for bleeding problems  PSYCHIATRIC: NEGATIVE for changes in mood or affect    Patient Active Problem List   Diagnosis     Latent tuberculosis by blood test     Body mass index 34.0-34.9, adult     Duodenal ulcer due to Helicobacter pylori     History reviewed. No pertinent surgical history.    Social History   Substance Use Topics     Smoking status: Never Smoker     Smokeless tobacco: Never Used     Alcohol use No      Family History   Problem Relation Age of Onset     Diabetes Father      Diabetes Brother            Labs reviewed in EPIC  BP Readings from Last 3 Encounters:   10/02/18 138/78   08/30/18 134/62   08/14/18 118/70    Wt Readings from Last 3 Encounters:   10/02/18 214 lb 1.6 oz (97.1 kg)   08/30/18 207 lb 8 oz (94.1 kg)   08/14/18 208 lb 12.8 oz (94.7 kg)                    OBJECTIVE:                                                    /78  Pulse 82  Temp 97.7  F (36.5  C) (Oral)  Wt 214 lb 1.6 oz (97.1 kg)  SpO2 97%  BMI 35.24 kg/m2 Body mass index is 35.24 kg/(m^2).   GENERAL:  Alert. No acute distress. WD WN  HEAD:  Normocephalic.Atramautic  EYES:  PERRLA.  EOMs are full.  Sclerae are clear.  Fundi not visualized. No discharge  EARS:  Normal canal without edema exudates or discharge. TM normal. No bulging or retraction  NOSE:  Clear rhinorrhea, mild mucosal edema  MOUTH/THROAT:  Oral hygene is good. Moist mucus membranes. No oral or pharyngeal lesions are seen. Oropharynx without exudates edema or erythema  NECK:  Supple.  No lymphadenopathy. ROM intact without nuchal rigidity.  LUNGS: no rhonchi. No wheezing or rales. Chest rise symmetrical and no tenderness to palpation. Good breath sounds throughout.  HEART:  Regular rhythm.  Normal rate.  No murmur  EXTREMITIES:  Warm, well perfused with good ROM and strength. No cyanosis or edema.    SKIN:  Warm and dry.  No rashes  NEUROLOGIC:  Normal tone      No results found for this or any previous visit (from the past 24 hour(s)).       ASSESSMENT/PLAN:                                                        ICD-10-CM    1. Cough R05 XR Chest 2 Views     methylPREDNISolone (MEDROL DOSEPAK) 4 MG tablet     Likely from allergies. History of latent TB, needs updated chest xray to rule out active TB. She reports sinus pressure, so post-nasal drip is likely causing the cough. Trial of medrol pack to help reduce inflammation and eliminate mucus. Patient requests  "referral to neurology which I don't think is necessary at this point. Return to clinic for any new or worsening symptoms or go to ER Urgent care in off hours      Patient Instructions   Start medication as directed  Get chest xray done  If not improved by the end of the week, return for recheck  Return to clinic for any new or worsening symptoms or go to ER Urgent care in off hours           Estimated body mass index is 35.24 kg/(m^2) as calculated from the following:    Height as of 8/1/18: 5' 5.35\" (1.66 m).    Weight as of this encounter: 214 lb 1.6 oz (97.1 kg).   Weight management plan: See PCP    Bridget Chu  [facility]      "

## 2018-10-02 NOTE — MR AVS SNAPSHOT
After Visit Summary   10/2/2018    Zoey Sinha    MRN: [id number]           Patient Information     Date Of Birth          1960        Visit Information        Provider Department      10/2/2018 2:30 PM Bridget Chu PA-C; [facility]        Today's Diagnoses     Cough    -  1      Care Instructions    Start medication as directed  Get chest xray done  If not improved by the end of the week, return for recheck  Return to clinic for any new or worsening symptoms or go to ER Urgent care in off hours            Follow-ups after your visit        Future tests that were ordered for you today     Open Future Orders        Priority Expected Expires Ordered    XR Chest 2 Views Routine 10/2/2018 10/2/2019 10/2/2018            Who to contact     If you have questions or need follow up information about today's clinic visit or your schedule please contact [facility] directly at [phone].  Normal or non-critical lab and imaging results will be communicated to you by MyChart, letter or phone within 4 business days after the clinic has received the results. If you do not hear from us within 7 days, please contact the clinic through MyChart or phone. If you have a critical or abnormal lab result, we will notify you by phone as soon as possible.  Submit refill requests through [employer] or call your pharmacy and they will forward the refill request to us. Please allow 3 business days for your refill to be completed.          Additional Information About Your Visit        Care EveryWhere ID     This is your Care EveryWhere ID. This could be used by other organizations to access your [place] medical records  [id number]        Your Vitals Were     Pulse Temperature Pulse Oximetry BMI (Body Mass Index)          82 97.7  F (36.5  C) (Oral) 97% 35.24 kg/m2         Blood Pressure from Last 3 Encounters:   10/02/18 138/78   08/30/18 134/62   08/14/18 118/70     Weight from Last 3 Encounters:   10/02/18 214 lb 1.6 oz (97.1 kg)   08/30/18 207 lb 8 oz (94.1 kg)   08/14/18 208 lb 12.8 oz (94.7 kg)                 Today's Medication Changes          These changes are accurate as of 10/2/18  3:06 PM.  If you have any questions, ask your nurse or doctor.               Start taking these medicines.        Dose/Directions    methylPREDNISolone 4 MG tablet   Commonly known as:  MEDROL DOSEPAK   Used for:  Cough   Started by:  Bridget Chu PA-C        Follow package instructions   Quantity:  21 tablet   Refills:  0            Where to get your medicines      These medications were sent to [facility] [place], [place] - [address]  [address] [address]     Phone:  [phone]     methylPREDNISolone 4 MG tablet                Primary Care Provider Fax #    Physician No Ref-Primary [phone]       No address on file        Equal Access to Services     SHAE JOHN AH: Hadii isai torres hadasho Soomaali, waaxda luqadaha, qaybta kaalmada adeegyada, waxay idiin hayerroln mavis khольга borja . So [facility] [phone].    ATENCIÓN: Si habla español, tiene a wright disposición servicios gratuitos de asistencia lingüística. Llame al [phone].    We comply with applicable federal civil rights laws and [place] laws. We do not discriminate on the basis of race, color, national origin, age, disability, sex, sexual orientation, or gender identity.            Thank you!     Thank you for choosing [facility]  for your [place]. Our goal is always to provide you with excellent care. Hearing back from our patients is one way we can continue to improve our services. Please take a few minutes to complete the written survey that you may receive in the mail after your visit with us. Thank you!             Your Updated Medication List - Protect others around you: Learn how to safely use, store and throw away your medicines at  www.disposemymeds.org.          This list is accurate as of 10/2/18  3:06 PM.  Always use your most recent med list.                   Brand Name Dispense Instructions for use Diagnosis    cetirizine 10 MG tablet    zyrTEC    30 tablet    Take 1 tablet (10 mg) by mouth every evening    PND (post-nasal drip)       cholecalciferol 66836 units capsule    VITAMIN D3    8 capsule    Take 1 capsule (50,000 Units) by mouth once a week    Vitamin D deficiency       methylPREDNISolone 4 MG tablet    MEDROL DOSEPAK    21 tablet    Follow package instructions    Cough       omeprazole 20 MG CR capsule    priLOSEC    90 capsule    Take 2 capsules (40 mg) by mouth 2 times daily    H. pylori infection       ranitidine 150 MG tablet    ZANTAC    60 tablet    Take 1 tablet (150 mg) by mouth 2 times daily    Gastroesophageal reflux disease without esophagitis

## 2018-10-02 NOTE — PATIENT INSTRUCTIONS
Start medication as directed  Get chest xray done  If not improved by the end of the week, return for recheck  Return to clinic for any new or worsening symptoms or go to ER Urgent care in off hours

## 2018-10-05 ENCOUNTER — OFFICE VISIT (OUTPATIENT)
Dept: FAMILY MEDICINE | Facility: CLINIC | Age: 58
End: 2018-10-05
Payer: COMMERCIAL

## 2018-10-05 VITALS
BODY MASS INDEX: 35.62 KG/M2 | OXYGEN SATURATION: 96 % | SYSTOLIC BLOOD PRESSURE: 138 MMHG | WEIGHT: 216.4 LBS | TEMPERATURE: 97.6 F | DIASTOLIC BLOOD PRESSURE: 82 MMHG | HEART RATE: 72 BPM

## 2018-10-05 DIAGNOSIS — M79.18 MYOFASCIAL PAIN ON LEFT SIDE: ICD-10-CM

## 2018-10-05 DIAGNOSIS — A04.8 H. PYLORI INFECTION: ICD-10-CM

## 2018-10-05 DIAGNOSIS — G44.219 EPISODIC TENSION-TYPE HEADACHE, NOT INTRACTABLE: Primary | ICD-10-CM

## 2018-10-05 PROCEDURE — 99214 OFFICE O/P EST MOD 30 MIN: CPT | Performed by: NURSE PRACTITIONER

## 2018-10-05 NOTE — PROGRESS NOTES
"  SUBJECTIVE:   Zoey Sinha is a 57 year old female who presents to clinic today for the following health issues:    Headache  Onset: about 1-2 weeks now     Description:   Location: all over head but mostly worse on the left side of head. Only happens when coughing. Think it is from using the medication for the eye drops that is causing the cough.     Character: throbbing pain, sharp pain, squeezing pain  Frequency:  Only happens when coughing.   Duration:  For a few minuets.     Intensity: mild, moderate    Progression of Symptoms:  same and intermittent    Accompanying Signs & Symptoms:  Stiff neck: no   Neck or upper back pain: YES- from falling down a few years ago   Fever: no  Sinus pressure: YES  Nausea or vomiting: no  Dizziness: YES  Numbness: no  Weakness: no  Visual changes: no    History:   Head trauma: no  Family history of migraines: no  Previous tests for headaches: no  Neurologist evaluations: no, but would like to get a referral to see one.   Able to do daily activities: YES  Wake with a headaches: no  Do headaches wake you up: no  Daily pain medication use: YES- tylenol   Work/school stressors/changes: no    Precipitating factors:   Does light make it worse: no  Does sound make it worse: no    Alleviating factors:  Does sleep help: YES    Therapies Tried and outcome: Ibuprofen (Advil, Motrin) and Tylenol    Pt wants to see a Neurologist     Recently treated for H pylori   After she saw me she felt \"better\" and then had a surgery and then the cough came back   Good without cough or HA and then HA came back with the cough    Feels she is allergic to vit D so stopped taking, was making her cough  Did not get repeat CXR recommended but is taking her prednison  Stopped ppi after 14 days, not sure if cough worsened off this, does not seem to understand GERD and allergies history, family helping her with her repeating questions and  here today   No history of CVA or any current weakness or other " stroke symptoms  Does have back and neck pain she feels stiff       Problem list and histories reviewed & adjusted, as indicated.  Additional history: as documented    Patient Active Problem List   Diagnosis     Latent tuberculosis by blood test     Body mass index 34.0-34.9, adult     Duodenal ulcer due to Helicobacter pylori     History reviewed. No pertinent surgical history.    Social History   Substance Use Topics     Smoking status: Never Smoker     Smokeless tobacco: Never Used     Alcohol use No     Family History   Problem Relation Age of Onset     Diabetes Father      Diabetes Brother          Current Outpatient Prescriptions   Medication Sig Dispense Refill     cetirizine (ZYRTEC) 10 MG tablet Take 1 tablet (10 mg) by mouth every evening 30 tablet 1     cholecalciferol (VITAMIN D3) 15831 units capsule Take 1 capsule (50,000 Units) by mouth once a week 8 capsule 0     methylPREDNISolone (MEDROL DOSEPAK) 4 MG tablet Follow package instructions 21 tablet 0     omeprazole (PRILOSEC) 20 MG CR capsule Take 2 capsules (40 mg) by mouth 2 times daily 90 capsule 1     ranitidine (ZANTAC) 150 MG tablet Take 1 tablet (150 mg) by mouth 2 times daily 60 tablet 1     Allergies   Allergen Reactions     Food      Beans, chicken and certain types of fish      Recent Labs   Lab Test  08/30/18   1356  08/07/18   1517   A1C  5.4   --    ALT  29   --    CR  0.72  0.76   GFRESTIMATED  83  78   GFRESTBLACK  >90  >90   POTASSIUM  3.6  4.0   TSH   --   1.08      BP Readings from Last 3 Encounters:   10/05/18 138/82   10/02/18 138/78   08/30/18 134/62    Wt Readings from Last 3 Encounters:   10/05/18 216 lb 6.4 oz (98.2 kg)   10/02/18 214 lb 1.6 oz (97.1 kg)   08/30/18 207 lb 8 oz (94.1 kg)                  Labs reviewed in EPIC    Reviewed and updated as needed this visit by clinical staff       Reviewed and updated as needed this visit by Provider         ROS:  Constitutional, HEENT, cardiovascular, pulmonary, GI, ,  musculoskeletal, neuro, skin, endocrine and psych systems are negative, except as otherwise noted.    OBJECTIVE:     /82  Pulse 72  Temp 97.6  F (36.4  C) (Oral)  Wt 216 lb 6.4 oz (98.2 kg)  SpO2 96%  BMI 35.62 kg/m2  Body mass index is 35.62 kg/(m^2).  GENERAL: healthy, alert and no distress  EYES: Eyes grossly normal to inspection, PERRL and conjunctivae and sclerae normal  HENT: ear canals and TM's normal, nose and mouth without ulcers or lesions  NECK: no adenopathy, no asymmetry, masses, or scars and thyroid normal to palpation  RESP: lungs clear to auscultation - no rales, rhonchi or wheezes  CV: regular rate and rhythm, normal S1 S2, no S3 or S4, no murmur, click or rub, no peripheral edema and peripheral pulses strong  ABDOMEN: soft, nontender, no hepatosplenomegaly, no masses and bowel sounds normal  MS: trigger points palpated in left shoulder and left neck region, one near neck reproduces the HA and   SKIN: no suspicious lesions or rashes  NEURO: Normal strength and tone, sensory exam grossly normal, mentation intact, cranial nerves 2-12 intact, DTR's normal and symmetric knees, gait normal including heel/toe/tandem walking and rapid alternating movements normal    Diagnostic Test Results:  none     ASSESSMENT/PLAN:         ICD-10-CM    1. Episodic tension-type headache, not intractable G44.219 [facility], [facility] CH[facility]RACTIC REFERRAL   2. Myofascial pain on left side M79.18 [facility] HAND, AND CHIROPRACTIC REFERRAL   3. H. pylori infection A04.8 omeprazole (PRILOSEC) 20 MG CR capsule   difficult historian working with difficult  today, had to repeat a lot and constant asking of  to do basic interpreting. Seems pt does not exercise and drink much water, attempted to discuss lifestyle changes recommended for tension HA, work on stretching and agrees to do PT for this  Massage recommended along with stretching after heat, NSAID to use minmally if tolerated and use cautiously as  recent ulcer treatment   Reassured no stroke symptoms, discussed what to look for and no Neurology appointment needed    Cough allergies likely, continue prednisone and get the CXR that was orderd to repeat due to her symptoms. Did teaching of GERD and allergies and will need continued reinforcement, Pt tends to stop her medications unrelated due to her thinking these cause the symptoms     35 min spent in direct face to face time with this pt, greater than 50% in counseling and coordination of care of above diagnoses  Due to  that was mostly ineffective and pt with numerous questions and difficult historian    Patient Instructions       Tension Headaches     Massaging your neck muscles can help relieve tension headache pain.     Tension headaches cause a dull, steady pain on both sides of the head and in the neck and the back of the head. The eyes may also feel tired. Tension headaches can be triggered by muscle tension and clenching, lack of sleep, poor posture, eyestrain, stress, depression, anxiety, arthritis of the neck, and other factors.  To help prevent tension headaches  Take the following steps:    Make sure your work area is properly set up to help you avoid neck strain and eyestrain.    Make sure that your eyeglass prescription is current and is appropriate for the work you do.    Learn techniques for relaxing and reducing emotional stress. These include deep breathing, progressive relaxation, yoga, meditation, and biofeedback.    Maintain a regular exercise regimen under the guidance of a doctor. This can help keep your neck and back flexible, strong, and relaxed.  To relieve the pain  Take the following steps:    Use moist heat to relax the muscles. Soak in a hot bath or wrap a warm, moist towel around your neck.    Brush your scalp lightly with a soft hairbrush.    Give yourself a massage. Knead the muscles running from your shoulders up the back of your skull.    Use an ice pack. Apply  this directly to the place where you feel pain.    Rest. Sleeping often helps relieve headache pain.    Drink plenty of fluids. Dehydration is another trigger for headaches. Don't drink alcohol as this will worsen dehydration.    Use pain medicine sparingly for moderate to severe pain.   Date Last Reviewed: 12/1/2017 2000-2017 [employer]. [address] [place] [address], [place] [address]. All rights reserved. This information is not intended as a substitute for professional medical care. Always follow your healthcare professional's instructions.        Self-Care for Headaches  Most headaches aren't serious and can be relieved with self-care. But some headaches may be a sign of another health problem like eye trouble or high blood pressure. To find the best treatment, learn what kind of headaches you get. For tension headaches, self-care will usually help. To treat migraines, ask your healthcare provider for advice. It is also possible to get both tension and migraine headaches. Self-care involves relieving the pain and avoiding headache  triggers  if you can.    Ways to reduce pain and tension  Try these steps:    Apply a cold compress or ice pack to the pain site.    Drink fluids. If nausea makes it hard to drink, try sucking on ice.    Rest. Protect yourself from bright light and loud noises.    Calm your emotions by imagining a peaceful scene.    Massage tight neck, shoulder, and head muscles.    To relax muscles, soak in a hot bath or use a hot shower.  Use medicines  Aspirin or aspirin substitutes, such as ibuprofen and acetaminophen, can relieve headache. Remember: Never give aspirin to anyone 18 years old or younger because of the risk of developing Reye syndrome. Use pain medicines only when necessary.  Track your headaches  Keeping a headache diary can help you and your healthcare provider identify what's causing your headaches:    Note when each headache happens.    Identify your activities and  "the foods you've eaten 6 to 8 hours before the headache began.    Look for any trends or \"triggers.\"  Signs of tension headache  Any of the following can be signs:    Dull pain or feeling of pressure in a tight band around your head    Pain in your neck or shoulders    Headache without a definite beginning or end    Headache after an activity such as driving or working on a computer  Signs of migraine  Any of the following can be signs:    Throbbing pain on one or both sides of your head    Nausea or vomiting    Extreme sensitivity to light, sound, and smells    Bright spots, flashes, or other visual changes    Pain or nausea so severe that you can't continue your daily activities  Call your healthcare provider   If you have any of the following symptoms, contact your healthcare provider:    A headache that lingers after a recent injury or bump to the head.    A fever with a stiff neck or pain when you bend your head toward your chest.    A headache along with slurred speech, changes in your vision, or numbness or weakness in your arms or legs.    A headache for longer than 3 days.    Frequent headaches, especially in the morning.    Headaches with seizures     Seek immediate medical attention if you have a headache that you would call \"the worst headache you have ever had.\"   Date Last Reviewed: 10/4/2015    2675-0124 [employer]. [address] [place] [address], [place] [address]. All rights reserved. This information is not intended as a substitute for professional medical care. Always follow your healthcare professional's instructions.            RANDALL Mendoza [facility]    "

## 2018-10-05 NOTE — MR AVS SNAPSHOT
After Visit Summary   10/5/2018    Zoey Sinha    MRN: [id number]           Patient Information     Date Of Birth          1960        Visit Information        Provider Department      10/5/2018 3:05 PM Holly Salas APRN CNP; LANGUAGE [facility]        Today's Diagnoses     Episodic tension-type headache, not intractable    -  1    Myofascial pain on left side        H. pylori infection          Care Instructions      Tension Headaches     Massaging your neck muscles can help relieve tension headache pain.     Tension headaches cause a dull, steady pain on both sides of the head and in the neck and the back of the head. The eyes may also feel tired. Tension headaches can be triggered by muscle tension and clenching, lack of sleep, poor posture, eyestrain, stress, depression, anxiety, arthritis of the neck, and other factors.  To help prevent tension headaches  Take the following steps:    Make sure your work area is properly set up to help you avoid neck strain and eyestrain.    Make sure that your eyeglass prescription is current and is appropriate for the work you do.    Learn techniques for relaxing and reducing emotional stress. These include deep breathing, progressive relaxation, yoga, meditation, and biofeedback.    Maintain a regular exercise regimen under the guidance of a doctor. This can help keep your neck and back flexible, strong, and relaxed.  To relieve the pain  Take the following steps:    Use moist heat to relax the muscles. Soak in a hot bath or wrap a warm, moist towel around your neck.    Brush your scalp lightly with a soft hairbrush.    Give yourself a massage. Knead the muscles running from your shoulders up the back of your skull.    Use an ice pack. Apply this directly to the place where you feel pain.    Rest. Sleeping often helps relieve headache pain.    Drink plenty of fluids. Dehydration is another trigger for headaches. Don't drink  "alcohol as this will worsen dehydration.    Use pain medicine sparingly for moderate to severe pain.   Date Last Reviewed: 12/1/2017 2000-2017 [employer]. [address] [facility], [place] [address]. All rights reserved. This information is not intended as a substitute for professional medical care. Always follow your healthcare professional's instructions.        Self-Care for Headaches  Most headaches aren't serious and can be relieved with self-care. But some headaches may be a sign of another health problem like eye trouble or high blood pressure. To find the best treatment, learn what kind of headaches you get. For tension headaches, self-care will usually help. To treat migraines, ask your healthcare provider for advice. It is also possible to get both tension and migraine headaches. Self-care involves relieving the pain and avoiding headache  triggers  if you can.    Ways to reduce pain and tension  Try these steps:    Apply a cold compress or ice pack to the pain site.    Drink fluids. If nausea makes it hard to drink, try sucking on ice.    Rest. Protect yourself from bright light and loud noises.    Calm your emotions by imagining a peaceful scene.    Massage tight neck, shoulder, and head muscles.    To relax muscles, soak in a hot bath or use a hot shower.  Use medicines  Aspirin or aspirin substitutes, such as ibuprofen and acetaminophen, can relieve headache. Remember: Never give aspirin to anyone 18 years old or younger because of the risk of developing Reye syndrome. Use pain medicines only when necessary.  Track your headaches  Keeping a headache diary can help you and your healthcare provider identify what's causing your headaches:    Note when each headache happens.    Identify your activities and the foods you've eaten 6 to 8 hours before the headache began.    Look for any trends or \"triggers.\"  Signs of tension headache  Any of the following can be signs:    Dull pain or feeling " "of pressure in a tight band around your head    Pain in your neck or shoulders    Headache without a definite beginning or end    Headache after an activity such as driving or working on a computer  Signs of migraine  Any of the following can be signs:    Throbbing pain on one or both sides of your head    Nausea or vomiting    Extreme sensitivity to light, sound, and smells    Bright spots, flashes, or other visual changes    Pain or nausea so severe that you can't continue your daily activities  Call your healthcare provider   If you have any of the following symptoms, contact your healthcare provider:    A headache that lingers after a recent injury or bump to the head.    A fever with a stiff neck or pain when you bend your head toward your chest.    A headache along with slurred speech, changes in your vision, or numbness or weakness in your arms or legs.    A headache for longer than 3 days.    Frequent headaches, especially in the morning.    Headaches with seizures     Seek immediate medical attention if you have a headache that you would call \"the worst headache you have ever had.\"   Date Last Reviewed: 10/4/2015    5467-2672 [employer]. [address] [place] [address]. All rights reserved. This information is not intended as a substitute for professional medical care. Always follow your healthcare professional's instructions.                Follow-ups after your visit        Additional Services     [facility] PT, HAND, AND CHIROPRACTIC REFERRAL       Physical Therapy, Hand Therapy and Chiropractic Care are available through:  *[facility]  *Hand Therapy (Occupational Therapy or Physical Therapy)  *[facility]    Call one number to schedule at any of the above locations: [phone].    Physical therapy, Hand therapy and/or Chiropractic care has been recommended by your physician as an excellent treatment option to reduce pain and help people " return to normal activities, including sports.  Therapy and/or chiropractic care services are a great complement or alternative to expensive and invasive surgery, injections, or long-term use of prescription medications. The primary goal is to identify the underlying problem and provide you the tools to manage your condition on your own.     Please be aware that coverage of these services is subject to the terms and limitations of your health insurance plan.  Call member services at your health plan with any benefit or coverage questions.      Please bring the following to your appointment:  *Your personal calendar for scheduling future appointments  *Comfortable clothing                  Future tests that were ordered for you today     Open Future Orders        Priority Expected Expires Ordered    [facility] PT, HAND, AND CHIROPRACTIC REFERRAL Routine  10/5/2019 10/5/2018            Who to contact     If you have questions or need follow up information about today's clinic visit or your schedule please contact [facility] [place] at [phone].  Normal or non-critical lab and imaging results will be communicated to you by MyChart, letter or phone within 4 business days after the clinic has received the results. If you do not hear from us within 7 days, please contact the clinic through MyChart or phone. If you have a critical or abnormal lab result, we will notify you by phone as soon as possible.  Submit refill requests through [employer] or call your pharmacy and they will forward the refill request to us. Please allow 3 business days for your refill to be completed.          Additional Information About Your Visit        Care EveryWhere ID     This is your Care EveryWhere ID. This could be used by other organizations to access your [facility] medical records  [id number]        Your Vitals Were     Pulse Temperature Pulse Oximetry BMI (Body Mass Index)          72 97.6  F (36.4  C) (Oral) 96% 35.62 kg/m2          Blood Pressure from Last 3 Encounters:   10/05/18 138/82   10/02/18 138/78   08/30/18 134/62    Weight from Last 3 Encounters:   10/05/18 216 lb 6.4 oz (98.2 kg)   10/02/18 214 lb 1.6 oz (97.1 kg)   08/30/18 207 lb 8 oz (94.1 kg)                 Where to get your medicines      These medications were sent to [facility] - [place], [place] - [address]  [address] [facility] [address], [facility] [address]     Phone:  [phone]     omeprazole 20 MG CR capsule          Primary Care Provider Fax #    Physician No Ref-Primary [phone]       No address on file        Equal Access to Services     SHAE JOHN : Viktor Cage, walaurada lukhoa, qaybta kaalmada alejandra, mikal quiroz. So [facility] [phone].    ATENCIÓN: Si habla español, tiene a wright disposición servicios gratuitos de asistencia lingüística. Llame al [phone].    We comply with applicable federal civil rights laws and [place] laws. We do not discriminate on the basis of race, color, national origin, age, disability, sex, sexual orientation, or gender identity.            Thank you!     Thank you for choosing [facility]  for your care. Our goal is always to provide you with excellent care. Hearing back from our patients is one way we can continue to improve our services. Please take a few minutes to complete the written survey that you may receive in the mail after your visit with us. Thank you!             Your Updated Medication List - Protect others around you: Learn how to safely use, store and throw away your medicines at www.disposemymeds.org.          This list is accurate as of 10/5/18  3:47 PM.  Always use your most recent med list.                   Brand Name Dispense Instructions for use Diagnosis    cetirizine 10 MG tablet    zyrTEC    30 tablet    Take 1 tablet (10 mg) by mouth every evening    PND (post-nasal drip)       cholecalciferol 91813 units capsule     VITAMIN D3    8 capsule    Take 1 capsule (50,000 Units) by mouth once a week    Vitamin D deficiency       methylPREDNISolone 4 MG tablet    MEDROL DOSEPAK    21 tablet    Follow package instructions    Cough       omeprazole 20 MG CR capsule    priLOSEC    90 capsule    Take 2 capsules (40 mg) by mouth 2 times daily    H. pylori infection       ranitidine 150 MG tablet    ZANTAC    60 tablet    Take 1 tablet (150 mg) by mouth 2 times daily    Gastroesophageal reflux disease without esophagitis

## 2018-10-05 NOTE — PATIENT INSTRUCTIONS
Tension Headaches     Massaging your neck muscles can help relieve tension headache pain.     Tension headaches cause a dull, steady pain on both sides of the head and in the neck and the back of the head. The eyes may also feel tired. Tension headaches can be triggered by muscle tension and clenching, lack of sleep, poor posture, eyestrain, stress, depression, anxiety, arthritis of the neck, and other factors.  To help prevent tension headaches  Take the following steps:    Make sure your work area is properly set up to help you avoid neck strain and eyestrain.    Make sure that your eyeglass prescription is current and is appropriate for the work you do.    Learn techniques for relaxing and reducing emotional stress. These include deep breathing, progressive relaxation, yoga, meditation, and biofeedback.    Maintain a regular exercise regimen under the guidance of a doctor. This can help keep your neck and back flexible, strong, and relaxed.  To relieve the pain  Take the following steps:    Use moist heat to relax the muscles. Soak in a hot bath or wrap a warm, moist towel around your neck.    Brush your scalp lightly with a soft hairbrush.    Give yourself a massage. Knead the muscles running from your shoulders up the back of your skull.    Use an ice pack. Apply this directly to the place where you feel pain.    Rest. Sleeping often helps relieve headache pain.    Drink plenty of fluids. Dehydration is another trigger for headaches. Don't drink alcohol as this will worsen dehydration.    Use pain medicine sparingly for moderate to severe pain.   Date Last Reviewed: 12/1/2017 2000-2017 [employer]. [address] [place] [address], [place] [address]. All rights reserved. This information is not intended as a substitute for professional medical care. Always follow your healthcare professional's instructions.        Self-Care for Headaches  Most headaches aren't serious and can be relieved with  "self-care. But some headaches may be a sign of another health problem like eye trouble or high blood pressure. To find the best treatment, learn what kind of headaches you get. For tension headaches, self-care will usually help. To treat migraines, ask your healthcare provider for advice. It is also possible to get both tension and migraine headaches. Self-care involves relieving the pain and avoiding headache  triggers  if you can.    Ways to reduce pain and tension  Try these steps:    Apply a cold compress or ice pack to the pain site.    Drink fluids. If nausea makes it hard to drink, try sucking on ice.    Rest. Protect yourself from bright light and loud noises.    Calm your emotions by imagining a peaceful scene.    Massage tight neck, shoulder, and head muscles.    To relax muscles, soak in a hot bath or use a hot shower.  Use medicines  Aspirin or aspirin substitutes, such as ibuprofen and acetaminophen, can relieve headache. Remember: Never give aspirin to anyone 18 years old or younger because of the risk of developing Reye syndrome. Use pain medicines only when necessary.  Track your headaches  Keeping a headache diary can help you and your healthcare provider identify what's causing your headaches:    Note when each headache happens.    Identify your activities and the foods you've eaten 6 to 8 hours before the headache began.    Look for any trends or \"triggers.\"  Signs of tension headache  Any of the following can be signs:    Dull pain or feeling of pressure in a tight band around your head    Pain in your neck or shoulders    Headache without a definite beginning or end    Headache after an activity such as driving or working on a computer  Signs of migraine  Any of the following can be signs:    Throbbing pain on one or both sides of your head    Nausea or vomiting    Extreme sensitivity to light, sound, and smells    Bright spots, flashes, or other visual changes    Pain or nausea so severe that " "you can't continue your daily activities  Call your healthcare provider   If you have any of the following symptoms, contact your healthcare provider:    A headache that lingers after a recent injury or bump to the head.    A fever with a stiff neck or pain when you bend your head toward your chest.    A headache along with slurred speech, changes in your vision, or numbness or weakness in your arms or legs.    A headache for longer than 3 days.    Frequent headaches, especially in the morning.    Headaches with seizures     Seek immediate medical attention if you have a headache that you would call \"the worst headache you have ever had.\"   Date Last Reviewed: 10/4/2015    2657-3463 [employer]. [address] [place] [address], [place] [address]. All rights reserved. This information is not intended as a substitute for professional medical care. Always follow your healthcare professional's instructions.        "

## 2018-10-24 ENCOUNTER — THERAPY VISIT (OUTPATIENT)
Dept: PHYSICAL THERAPY | Facility: CLINIC | Age: 58
End: 2018-10-24
Payer: COMMERCIAL

## 2018-10-24 DIAGNOSIS — G44.219 EPISODIC TENSION-TYPE HEADACHE, NOT INTRACTABLE: ICD-10-CM

## 2018-10-24 DIAGNOSIS — M54.2 NECK PAIN: Primary | ICD-10-CM

## 2018-10-24 DIAGNOSIS — M79.18 MYOFASCIAL PAIN ON LEFT SIDE: ICD-10-CM

## 2018-10-24 PROCEDURE — G8981 BODY POS CURRENT STATUS: HCPCS | Mod: GP | Performed by: PHYSICAL THERAPIST

## 2018-10-24 PROCEDURE — 97161 PT EVAL LOW COMPLEX 20 MIN: CPT | Mod: GP | Performed by: PHYSICAL THERAPIST

## 2018-10-24 PROCEDURE — 97110 THERAPEUTIC EXERCISES: CPT | Mod: GP | Performed by: PHYSICAL THERAPIST

## 2018-10-24 PROCEDURE — G8982 BODY POS GOAL STATUS: HCPCS | Mod: GP | Performed by: PHYSICAL THERAPIST

## 2018-10-24 NOTE — PROGRESS NOTES
[facility] Initial Evaluation  Subjective:  Patient is a 58 year old female presenting with rehab cervical spine hpi. The history is limited by a language barrier. A  was used.   Zoey Sinha is a 58 year old female with a cervical spine condition.  Condition occurred with:  Insidious onset.  Condition occurred: for unknown reasons.  This is a chronic condition  10/5/18 - MD order  Pt reports chronic left shoulder neck pain that began over 1 year ago. Feels it is a lot of tightness, feels most at night. Has developed more in anterior shoulder and chest and anterior and posterior rib cage/thorax as well.    Patient reports pain:  Cervical left side and lower cervical spine.  Radiates to:  Head (left scapular area, left lower anterior/posterior ribcage area, left anterior chest).  Pain is described as other (tight, intense) and is intermittent and reported as 7/10.  Associated symptoms:  Loss of motion/stiffness and headache (denies N/T. posterior headache and some into temoral area). Pain is worse during the night.  Symptoms are exacerbated by other (lying down, sleeping - lying on left side) and relieved by other and heat (self massage).  Since onset symptoms are unchanged (overall off and on).  Special testing: none.  Previous treatment: none.  Improvement with previous treatment: NA.  General health as reported by patient is good.                                              Objective:  Standing Alignment:    Cervical/Thoracic:  Forward head  Shoulder/UE:  Rounded shoulders                  Flexibility/Screens:     Upper Extremity:    Decreased left upper extremity flexibility at:  Pectoralis Minor      Spine:  Decreased left spine flexibility:  Rhomboids; Upper Trap and Levator                    Cervical/Thoracic Evaluation    AROM:  AROM Cervical:    Flexion:            WNL painfree  Extension:       80% with B neck tension  Rotation:         Left: WNL with contralat  tightness     Right: WNL with contralat tightness  Side Bend:      Left: WNL with contralat tightness     Right:  WNL with contralat tightness      Headaches: cervical          Cervical Palpation:  : hypertonicity in left rhomboids, levator scap and UT. significant tenderness in left pec minor.  Tenderness present at Left:    Upper Trap and Levator  Tenderness not present at Left:   Rhomboids; Erector Spinae or Suboccipitals                 Shoulder Evaluation:  ROM:  AROM:    Flexion:  Left:  WNL    Right:  WNL    Abduction:  Left: WNL   Right:  WNL - right elbow pain                            Strength:    Flexion: Left:5-/5   Pain:    Right: 5/5     Pain:     Abduction:  Left: 5-/5  Pain:    Right: 5/5     Pain:        Horizontal Abduction:  Left:4/5     Pain:                                                     General     ROS    Assessment/Plan:    Patient is a 58 year old female with cervical complaints.    Patient has the following significant findings with corresponding treatment plan.                Diagnosis 1:  Left cervical pain/tightness    Pain -  manual therapy, self management, education and home program  Decreased ROM/flexibility - manual therapy, therapeutic exercise and home program  Decreased strength - therapeutic exercise, therapeutic activities and home program  Decreased function - therapeutic activities and home program  Impaired posture - neuro re-education, therapeutic activities and home program    Therapy Evaluation Codes:   1) History comprised of:   Personal factors that impact the plan of care:      None.    Comorbidity factors that impact the plan of care are:      None.     Medications impacting care: None.  2) Examination of Body Systems comprised of:   Body structures and functions that impact the plan of care:      Cervical spine and Shoulder.   Activity limitations that impact the plan of care are:      Sleeping and Laying down.  3) Clinical presentation characteristics  are:   Stable/Uncomplicated.  4) Decision-Making    Low complexity using standardized patient assessment instrument and/or measureable assessment of functional outcome.  Cumulative Therapy Evaluation is: Low complexity.    Previous and current functional limitations:  (See Goal Flow Sheet for this information)    Short term and Long term goals: (See Goal Flow Sheet for this information)     Communication ability:  Patient has an  for communication clarity.  Treatment Explanation - The following has been discussed with the patient:   RX ordered/plan of care  Anticipated outcomes  Possible risks and side effects  This patient would benefit from PT intervention to resume normal activities.   Rehab potential is good.    Frequency:  1 X week, once daily  Duration:  for 6 weeks  Discharge Plan:  Achieve all LTG.  Independent in home treatment program.  Reach maximal therapeutic benefit.    Please refer to the daily flowsheet for treatment today, total treatment time and time spent performing 1:1 timed codes.

## 2018-10-24 NOTE — MR AVS SNAPSHOT
After Visit Summary   10/24/2018    Zoey Sinha    MRN: [id number]           Patient Information     Date Of Birth          1960        Visit Information        Provider Department      10/24/2018 2:25 PM Sandra Alicia, PT; LANGUAGE [facility]        Today's Diagnoses     Neck pain    -  1    Myofascial pain on left side        Episodic tension-type headache, not intractable           Follow-ups after your visit        Your next 10 appointments already scheduled     Oct 30, 2018  1:10 PM CDT   YAEL Spine with Ko Appiah PT   [facility] [place] ([facility] [place])    [address] [place] [id number] [phone]            Nov 06, 2018  1:10 PM CST   YAEL Spine with Ko Appiah PT   [facility] ([facility])    [address] [place] [address] [phone]              Who to contact     If you have questions or need follow up information about today's clinic visit or your schedule please contact [facility] directly at [phone].  Normal or non-critical lab and imaging results will be communicated to you by MyChart, letter or phone within 4 business days after the clinic has received the results. If you do not hear from us within 7 days, please contact the clinic through MyChart or phone. If you have a critical or abnormal lab result, we will notify you by phone as soon as possible.  Submit refill requests through [employer] or call your pharmacy and they will forward the refill request to us. Please allow 3 business days for your refill to be completed.          Additional Information About Your Visit        Care EveryWhere ID     This is your Care EveryWhere ID. This could be used by other organizations to access your [facility] medical records  [id number]         Blood Pressure from Last 3 Encounters:   10/05/18 138/82   10/02/18 138/78    08/30/18 134/62    Weight from Last 3 Encounters:   10/05/18 98.2 kg (216 lb 6.4 oz)   10/02/18 97.1 kg (214 lb 1.6 oz)   08/30/18 94.1 kg (207 lb 8 oz)              We Performed the Following     HC PT EVAL, LOW COMPLEXITY     YAEL INITIAL EVAL REPORT     [facility] PT, HAND, AND CHIROPRACTIC REFERRAL     THERAPEUTIC EXERCISES        Primary Care Provider Fax #    Physician No Ref-Primary [phone]       No address on file        Equal Access to Services     SHAE JOHN : Hadii isai ku hadasho Soomaali, waaxda luqadaha, qaybta kaalmada adetatoyazia, mikal borja . So [facility] [phone].    ATENCIÓN: Si habla español, tiene a wright disposición servicios gratuitos de asistencia lingüística. Llame al [phone].    We comply with applicable federal civil rights laws and [place] laws. We do not discriminate on the basis of race, color, national origin, age, disability, sex, sexual orientation, or gender identity.            Thank you!     Thank you for choosing [facility] [place]  for your care. Our goal is always to provide you with excellent care. Hearing back from our patients is one way we can continue to improve our services. Please take a few minutes to complete the written survey that you may receive in the mail after your visit with us. Thank you!             Your Updated Medication List - Protect others around you: Learn how to safely use, store and throw away your medicines at www.disposemymeds.org.          This list is accurate as of 10/24/18  3:45 PM.  Always use your most recent med list.                   Brand Name Dispense Instructions for use Diagnosis    cetirizine 10 MG tablet    zyrTEC    30 tablet    Take 1 tablet (10 mg) by mouth every evening    PND (post-nasal drip)       cholecalciferol 26686 units capsule    VITAMIN D3    8 capsule    Take 1 capsule (50,000 Units) by mouth once a week    Vitamin D deficiency       methylPREDNISolone 4 MG tablet     MEDROL DOSEPAK    21 tablet    Follow package instructions    Cough       omeprazole 20 MG CR capsule    priLOSEC    90 capsule    Take 2 capsules (40 mg) by mouth 2 times daily    H. pylori infection       ranitidine 150 MG tablet    ZANTAC    60 tablet    Take 1 tablet (150 mg) by mouth 2 times daily    Gastroesophageal reflux disease without esophagitis

## 2018-11-06 ENCOUNTER — THERAPY VISIT (OUTPATIENT)
Dept: PHYSICAL THERAPY | Facility: CLINIC | Age: 58
End: 2018-11-06
Payer: COMMERCIAL

## 2018-11-06 DIAGNOSIS — M54.2 NECK PAIN: ICD-10-CM

## 2018-11-06 PROCEDURE — 97530 THERAPEUTIC ACTIVITIES: CPT | Mod: GP | Performed by: PHYSICAL THERAPIST

## 2018-11-06 PROCEDURE — 97110 THERAPEUTIC EXERCISES: CPT | Mod: GP | Performed by: PHYSICAL THERAPIST

## 2018-11-06 PROCEDURE — G8982 BODY POS GOAL STATUS: HCPCS | Mod: GP | Performed by: PHYSICAL THERAPIST

## 2018-11-06 PROCEDURE — G8981 BODY POS CURRENT STATUS: HCPCS | Mod: GP | Performed by: PHYSICAL THERAPIST

## 2018-11-06 NOTE — MR AVS SNAPSHOT
"              After Visit Summary   11/6/2018    Zoey Sinha    MRN: [id number]           Patient Information     Date Of Birth          1960        Visit Information        Provider Department      11/6/2018 12:55 PM Ko Appiah PT; LANGUAGE [facility]        Today's Diagnoses     Neck pain           Follow-ups after your visit        Your next 10 appointments already scheduled     Nov 15, 2018  1:10 PM CST   YAEL Spine with Ko Appiah PT   [facility] (\[facility])    [address] [place] [address] [phone]              Who to contact     If you have questions or need follow up information about today's clinic visit or your schedule please contact [facility] directly at [phone].  Normal or non-critical lab and imaging results will be communicated to you by MyChart, letter or phone within 4 business days after the clinic has received the results. If you do not hear from us within 7 days, please contact the clinic through MyChart or phone. If you have a critical or abnormal lab result, we will notify you by phone as soon as possible.  Submit refill requests through [employer] or call your pharmacy and they will forward the refill request to us. Please allow 3 business days for your refill to be completed.          Additional Information About Your Visit        MyChart Information     [employer] lets you send messages to your doctor, view your test results, renew your prescriptions, schedule appointments and more. To sign up, go to [email or web address] . Click on \"Log in\" on the left side of the screen, which will take you to the Welcome page. Then click on \"Sign up Now\" on the right side of the page.     You will be asked to enter the access code listed below, as well as some personal information. Please follow the directions to create your username and password.     Your access code " is: [email or web address]  Expires: 2019  3:15 PM     Your access code will  in 90 days. If you need help or a new code, please call your [facility] clinic or [phone].        Care EveryWhere ID     This is your Care EveryWhere ID. This could be used by other organizations to access your [facility] medical records  [id number]         Blood Pressure from Last 3 Encounters:   10/05/18 138/82   10/02/18 138/78   18 134/62    Weight from Last 3 Encounters:   10/05/18 98.2 kg (216 lb 6.4 oz)   10/02/18 97.1 kg (214 lb 1.6 oz)   18 94.1 kg (207 lb 8 oz)              We Performed the Following     THERAPEUTIC ACTIVITIES     THERAPEUTIC EXERCISES        Primary Care Provider Fax #    Physician No Ref-Primary [phone]       No address on file        Equal Access to Services     [facility] : Hadii isai stollo Fauzia, waaxda luqadaha, qaybta kaalmada adetatoyada, mikal borja . So [facility] [phone].    ATENCIÓN: Si habla español, tiene a wright disposición servicios gratuitos de asistencia lingüística. Sammy al [phone].    We comply with applicable federal civil rights laws and [place] laws. We do not discriminate on the basis of race, color, national origin, age, disability, sex, sexual orientation, or gender identity.            Thank you!     Thank you for choosing [facility] [place]  for your care. Our goal is always to provide you with excellent care. Hearing back from our patients is one way we can continue to improve our services. Please take a few minutes to complete the written survey that you may receive in the mail after your visit with us. Thank you!             Your Updated Medication List - Protect others around you: Learn how to safely use, store and throw away your medicines at www.disposemymeds.org.          This list is accurate as of 18  3:15 PM.  Always use your most recent med list.                   Brand Name Dispense  Instructions for use Diagnosis    cetirizine 10 MG tablet    zyrTEC    30 tablet    Take 1 tablet (10 mg) by mouth every evening    PND (post-nasal drip)       cholecalciferol 22031 units capsule    VITAMIN D3    8 capsule    Take 1 capsule (50,000 Units) by mouth once a week    Vitamin D deficiency       methylPREDNISolone 4 MG tablet    MEDROL DOSEPAK    21 tablet    Follow package instructions    Cough       omeprazole 20 MG CR capsule    priLOSEC    90 capsule    Take 2 capsules (40 mg) by mouth 2 times daily    H. pylori infection       ranitidine 150 MG tablet    ZANTAC    60 tablet    Take 1 tablet (150 mg) by mouth 2 times daily    Gastroesophageal reflux disease without esophagitis

## 2019-01-31 ENCOUNTER — TELEPHONE (OUTPATIENT)
Dept: FAMILY MEDICINE | Facility: CLINIC | Age: 59
End: 2019-01-31

## 2019-01-31 NOTE — TELEPHONE ENCOUNTER
Panel Management Review      Patient has the following on her problem list: None      Composite cancer screening  Chart review shows that this patient is due/due soon for the following Pap Smear, Mammogram and Colonoscopy  Summary:    Patient is due/failing the following:   COLONOSCOPY, MAMMOGRAM and PAP    Action needed:   Patient needs office visit for physical.    Type of outreach:    Sent letter.    Questions for provider review:    None                                                                                                                                    Anne Rutherford MA

## 2019-01-31 NOTE — LETTER
[address]  [facility] [address]  Phone: [phone]  Fax: [phone]              January 31, 2019      Zoey Sinha  [address]   [facility] [address]        Dear Zoey,    I care about your health and have reviewed your health plan. I have reviewed your medical conditions, medication list, and lab results and am making recommendations based on this review, to better manage your health.    You are in particular need of attention regarding:  -Breast Cancer Screening  -Colon Cancer Screening  -Cervical Cancer Screening    I am recommending that you:  -schedule a MAMMOGRAM which is due.    1 in 8 women will develop invasive breast cancer during her lifetime and it is the most common non-skin cancer in American women.  EARLY detection, new treatments, and a better understanding of the disease have increased survival rates - the 5 year survival rate in the 1960s was 63% and today it is close to 90%.           -schedule a COLONOSCOPY to look for colon cancer (due every 10 years or 5 years in higher risk situations.)        Colon cancer is now the second leading cause of cancer-related deaths in the [place] for both men and women and there are over 130,000 new cases and 50,000 deaths per year from colon cancer.  Colonoscopies can prevent 90-95% of these deaths.  Problem lesions can be removed before they ever become cancer.  This test is not only looking for cancer, but also getting rid of precancerious lesions.            If you have completed either one of these tests at another facility, please call with the details of when and where the tests were done and if they were normal or not. Or have the records sent to our clinic so that we can best coordinate your care.    -schedule a PAP SMEAR EXAM which is due.  Please disregard this reminder if you have had this exam elsewhere within the last year.  It would be helpful for us to have a copy  of your recent pap smear report in our file so that we can best coordinate your care.      Here is a list of Health Maintenance topics that are due now or due soon:  Health Maintenance Due   Topic Date Due     HIV SCREEN (SYSTEM ASSIGNED)  10/10/1978     Hepatitis C Screening  10/10/1978     Pap Smear - every 3 years  10/10/1981     Mammogram - every 2 years  10/10/2000     Cholesterol Lab - every 5 years  10/10/2005     Colon Cancer Screening - every 10 years.  10/10/2010     Zoster (Shingles) Vaccine (1 of 2) 10/10/2010     Discuss Advance Directive Planning  10/10/2015     Flu Vaccine (1) 09/01/2018       Please call us at [phone] (or use [employer]) to address the above recommendations.     Thank you for trusting [facility] and we appreciate the opportunity to serve you.  We look forward to supporting your healthcare needs in the future.    Healthy Regards,    Holly Salas NP

## 2019-02-04 PROBLEM — M54.2 NECK PAIN: Status: RESOLVED | Noted: 2018-10-24 | Resolved: 2019-02-04

## 2019-02-04 NOTE — PROGRESS NOTES
Discharge Note    Progress reporting period is from Oct 24, 2018 to Nov 6, 2018.     Zoey failed to return for next follow up visit and current status is unknown.  Please see information below for last relevant information on current status.  Patient seen for Rxs Used: 2 visits.  SUBJECTIVE  Subjective changes noted by patient:  Subjective: Reports that she feels better when she does the exercises, but pain comes back afterwards. Has been somewhat compliant with HEP.   .  Current pain level is Current Pain level: 5/10.     Previous pain level was  Initial Pain level: 7/10.   Changes in function:  Yes (See Goal flowsheet attached for changes in current functional level)  Adverse reaction to treatment or activity: None    OBJECTIVE  Changes noted in objective findings: Objective: Sits with flexed posture, rounded shoulders, forward head. Significant cueing for proper scapular position with exercises.      ASSESSMENT/PLAN  Diagnosis: Left cervical pain/tightness   DIAGNOSIS:  The encounter diagnosis was Neck pain.  Updated problem list and treatment plan:   Pain - HEP  Decreased ROM/flexibility - HEP  Decreased function - HEP  Decreased strength - HEP  Impaired posture - HEP  STG/LTGs have been met or progress has been made towards goals:  Yes, please see goal flowsheet for most current information  Assessment of Progress: current status is unknown.  Last current status: Assessment of progress: Pt is progressing as expected   Self Management Plans:  HEP  I have re-evaluated this patient and find that the nature, scope, duration and intensity of the therapy is appropriate for the medical condition of the patient.  Zoey continues to require the following intervention to meet STG and LTG's:  HEP.    Recommendations:  Discharge with current home program.  Patient to follow up with MD as needed.    Please refer to the daily flowsheet for treatment today, total treatment time and time spent performing 1:1 timed codes.

## 2019-03-08 ENCOUNTER — TELEPHONE (OUTPATIENT)
Dept: FAMILY MEDICINE | Facility: CLINIC | Age: 59
End: 2019-03-08

## 2019-03-08 NOTE — TELEPHONE ENCOUNTER
Panel Management Review      Patient has the following on her problem list: None      Composite cancer screening  Chart review shows that this patient is due/due soon for the following Pap Smear, Mammogram and Colonoscopy  Summary:    Patient is due/failing the following:   COLONOSCOPY, MAMMOGRAM and PAP    Action needed:   Patient needs office visit for wellness check. and Patient needs referral/order: Mammogram and Colonoscopy    Type of outreach:    Sent letter.    Questions for provider review:    None                                                                                                                                    Colin Lester CMA.       Chart routed to none.

## 2019-03-08 NOTE — LETTER
[address]  [facility] [address]  Phone: [phone]  Fax: [phone]              March 8, 2019      Zoey Sinha  [address]   [facility] [address]        Dear Zoey,    I care about your health and have reviewed your health plan. I have reviewed your medical conditions, medication list, and lab results and am making recommendations based on this review, to better manage your health.    You are in particular need of attention regarding:  -Breast Cancer Screening  -Colon Cancer Screening  -Wellness (Physical) Visit     I am recommending that you:  -schedule a WELLNESS (Physical) APPOINTMENT with me.   I will check fasting labs the same day - nothing to eat except water and meds for 8-10 hours prior.    -schedule a MAMMOGRAM which is due.    1 in 8 women will develop invasive breast cancer during her lifetime and it is the most common non-skin cancer in [place] women.  EARLY detection, new treatments, and a better understanding of the disease have increased survival rates - the 5 year survival rate in the 1960s was 63% and today it is close to 90%.    If you are under/uninsured, we recommend you contact the [facility]. They offer mammograms at no charge or on a sliding fee charge. You can schedule with them at [phone]. Please have them send us the results.      Please disregard this reminder if you have had this exam elsewhere within the last year.  It would be helpful for us to have a copy of your mammogram report in your file so that we can best coordinate your care - please contact us with when your test was done so we can update your record.             -schedule a COLONOSCOPY to look for colon cancer (due every 10 years or 5 years in higher risk situations.)        Colon cancer is now the second leading cause of cancer-related deaths in the [place] for both men and women and there are over 130,000 new cases and 50,000  deaths per year from colon cancer.  Colonoscopies can prevent 90-95% of these deaths.  Problem lesions can be removed before they ever become cancer.  This test is not only looking for cancer, but also getting rid of precancerious lesions.    If you are under/uninsured, we recommend you contact the [employer] program. [employer] is a free colorectal cancer screening program that provides colonoscopies for eligible under/uninsured [place] men and women. If you are interested in receiving a free colonoscopy, please call [facility] at [phone] (mention code [employer]) to see if you re eligible.      If you do not wish to do a colonoscopy or cannot afford to do one, at this time, there is another option. It is called a FIT test or Fecal Immunochemical Occult Blood Test (take home stool sample kit).  It does not replace the colonoscopy for colorectal cancer screening, but it can detect hidden bleeding in the lower colon.  It does need to be repeated every year and if a positive result is obtained, you would be referred for a colonoscopy.          If you have completed either one of these tests at another facility, please call with the details of when and where the tests were done and if they were normal or not. Or have the records sent to our clinic so that we can best coordinate your care.      Here is a list of Health Maintenance topics that are due now or due soon:  Health Maintenance Due   Topic Date Due     Preventive Care Visit  1960     HIV SCREEN (SYSTEM ASSIGNED)  10/10/1978     Hepatitis C Screening  10/10/1978     Pap Smear - every 3 years  10/10/1981     Mammogram - every 2 years  10/10/2000     Cholesterol Lab - every 5 years  10/10/2005     Colon Cancer Screening - every 10 years.  10/10/2010     Zoster (Shingles) Vaccine (1 of 2) 10/10/2010     Discuss Advance Directive Planning  10/10/2015     Flu Vaccine (1) 09/01/2018       Please call us at [phone] (or use [employer]) to address the  above recommendations.     Thank you for trusting [facility] and we appreciate the opportunity to serve you.  We look forward to supporting your healthcare needs in the future.    Healthy Regards,    Holly Salas NP

## 2019-05-08 ENCOUNTER — TELEPHONE (OUTPATIENT)
Dept: FAMILY MEDICINE | Facility: CLINIC | Age: 59
End: 2019-05-08

## 2019-05-08 NOTE — TELEPHONE ENCOUNTER
Panel Management Review      Patient has the following on her problem list: None      Composite cancer screening  Chart review shows that this patient is due/due soon for the following Pap Smear, Colonoscopy and Fecal Colorectal (FIT)  Summary:    Patient is due/failing the following:   COLONOSCOPY, FIT, PAP and PHYSICAL    Action needed:   Patient needs office visit for Colonoscopy, Pap and Physical     Type of outreach:    Phone, left message for patient to call back.     Questions for provider review:    None                                                                                                                                    Zulma Rdz MA

## 2019-05-08 NOTE — TELEPHONE ENCOUNTER
Panel Management Review      Patient has the following on her problem list: {Problems to Review:296069}      Composite cancer screening  Chart review shows that this patient is due/due soon for the following Pap Smear, Colonoscopy and Fecal Colorectal (FIT)  Summary:    Patient is due/failing the following:   COLONOSCOPY, FIT, PAP and PHYSICAL    Action needed:   Patient needs office visit for Colonoscopy, pap and physical .    Type of outreach:    Phone, left message for patient to call back.     Questions for provider review:    None                                                                                                                                  Zulma Rdz MA

## 2024-08-02 PROCEDURE — 85307 ASSAY ACTIVATED PROTEIN C: CPT

## 2024-08-05 ENCOUNTER — LAB REQUISITION (OUTPATIENT)
Dept: LAB | Facility: CLINIC | Age: 64
End: 2024-08-05

## 2024-08-05 DIAGNOSIS — M25.50 PAIN IN UNSPECIFIED JOINT: ICD-10-CM

## 2024-08-09 LAB — APCR PPP: 2.76 {RATIO}
